# Patient Record
Sex: FEMALE | Race: AMERICAN INDIAN OR ALASKA NATIVE
[De-identification: names, ages, dates, MRNs, and addresses within clinical notes are randomized per-mention and may not be internally consistent; named-entity substitution may affect disease eponyms.]

---

## 2017-04-08 NOTE — EMERGENCY DEPARTMENT REPORT
ED General Adult HPI





- General


Chief complaint: Medical Clearance


Stated complaint: HANDS/LEGS GOING NUMB


Time Seen by Provider: 17 17:51


Source: patient


Mode of arrival: Ambulatory


Limitations: No Limitations





- History of Present Illness


Initial comments: 





PT c/o her hands and legs intermittently going numb x 1 month.  PT states she 

will have episodes of numbness daily and states it will last for a few seconds.

  On some days, she has multiple episodes.  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 

and thinks she might feel better if she had a week off work.  PT has been 

working in a warehouse for 1 year.  PT states she had Bell's Palsy at 14 years 

old.  PT denies facial numbness or paralysis.  PT unsure when her lmp was.  PT 

states it was sometime last year and when she missed her cycle, she took a 

pregnancy test and it was negative.  PT states she currently has an  

IUD.  





PT is laying on her left side with her left arm bent under her head and pt 

states, "My arm just went numb"


MD Complaint: numbness 


Onset/Timin


-: Gradual, month(s)


Location: left, right, upper extremity, lower extremity


Severity scale (0 -10): 7


Quality: other (painful numbness )


Consistency: intermittent


Improves with: other (at times, repositioning )


Worsens with: other (working )


Associated Symptoms: denies: chest pain, fever/chills, nausea/vomiting


Treatments Prior to Arrival: other (drinking ensure and beet juice )





- Related Data


 Allergies











Allergy/AdvReac Type Severity Reaction Status Date / Time


 


No Known Allergies Allergy   Unverified 17 15:32














ED Review of Systems


ROS: 


Stated complaint: HANDS/LEGS GOING NUMB


Other details as noted in HPI





Comment: All other systems reviewed and negative


Constitutional: denies: chills, fever


Eyes: denies: vision change


ENT: denies: ear pain, throat pain, congestion


Respiratory: denies: cough, shortness of breath


Cardiovascular: denies: chest pain


Endocrine: increased urine.  denies: increased thirst


Gastrointestinal: nausea.  denies: abdominal pain, vomiting


Genitourinary: frequency, abnormal menses.  denies: dysuria, discharge


Musculoskeletal: back pain (at times, she will have an ache )


Neurological: numbness.  denies: abnormal gait





ED Past Medical Hx





- Past Medical History


Previous Medical History?: Yes


Hx Hypertension: Yes (DURING PREG)





- Surgical History


Additional Surgical History: C SECTION





- Social History


Smoking Status: Current Every Day Smoker


Substance Use Type: None





ED Physical Exam





- General


Limitations: No Limitations


General appearance: alert, in no apparent distress





- Head


Head exam: Present: atraumatic, normocephalic, normal inspection





- Eye


Eye exam: Present: normal appearance, PERRL, EOMI.  Absent: conjunctival 

injection





- ENT


ENT exam: Present: normal exam, normal orophraynx, mucous membranes moist, TM's 

normal bilaterally, normal external ear exam





- Neck


Neck exam: Present: normal inspection, full ROM.  Absent: tenderness, 

meningismus





- Respiratory


Respiratory exam: Present: normal lung sounds bilaterally.  Absent: respiratory 

distress, wheezes





- Cardiovascular


Cardiovascular Exam: Present: regular rate, normal rhythm, normal heart sounds





- GI/Abdominal


GI/Abdominal exam: Present: soft.  Absent: tenderness





- Extremities Exam


Extremities exam: Present: normal inspection, full ROM, normal capillary 

refill.  Absent: tenderness, pedal edema, joint swelling, calf tenderness





- Back Exam


Back exam: Present: normal inspection, full ROM.  Absent: tenderness, CVA 

tenderness (R), CVA tenderness (L), muscle spasm, paraspinal tenderness, 

vertebral tenderness





- Neurological Exam


Neurological exam: Present: alert, oriented X3, CN II-XII intact, normal gait





- Psychiatric


Psychiatric exam: Present: flat affect





- Skin


Skin exam: Present: warm, dry, intact, normal color





ED Course


 Vital Signs











  17





  15:26


 


Temperature 98.7 F


 


Pulse Rate 97 H


 


Blood Pressure 121/78














- Reevaluation(s)


Reevaluation #1: 





17 18:17


PT aware of plan of care. 








Reevaluation #2: 





17 21:05


PT aware of lab results and plan of care.  PT asking if we did an Aids test.  

PT states she does not think that is what she has but she would like to know.  

PT aware she will need to follow up with PCP or health department for HIV 

testing.  PT verbalizes understanding. 











ED Medical Decision Making





- Lab Data


Result diagrams: 


 17 18:25





 17 19:26








 Lab Results











  17 Range/Units





  18:10 18:10 18:25 


 


WBC    6.9  (4.5-11.0)  K/mm3


 


RBC    4.44  (3.65-5.03)  M/mm3


 


Hgb    12.8  (10.1-14.3)  gm/dl


 


Hct    38.9  (30.3-42.9)  %


 


MCV    88  (79-97)  fl


 


MCH    29  (28-32)  pg


 


MCHC    33  (30-34)  %


 


RDW    12.9 L  (13.2-15.2)  %


 


Plt Count    232  (140-440)  K/mm3


 


Lymph % (Auto)    25.5  (13.4-35.0)  %


 


Mono % (Auto)    12.4 H  (0.0-7.3)  %


 


Eos % (Auto)    8.4 H  (0.0-4.3)  %


 


Baso % (Auto)    0.5  (0.0-1.8)  %


 


Lymph #    1.8  (1.2-5.4)  K/mm3


 


Mono #    0.9 H  (0.0-0.8)  K/mm3


 


Eos #    0.6 H  (0.0-0.4)  K/mm3


 


Baso #    0.0  (0.0-0.1)  K/mm3


 


Seg Neutrophils %    53.2  (40.0-70.0)  %


 


Seg Neutrophils #    3.7  (1.8-7.7)  K/mm3


 


Sodium     


 


Potassium     


 


Chloride     


 


Carbon Dioxide     


 


Anion Gap     


 


BUN     


 


Creatinine     


 


Estimated GFR     


 


BUN/Creatinine Ratio     


 


Glucose     


 


Calcium     


 


Total Bilirubin     


 


AST     


 


ALT     


 


Alkaline Phosphatase     


 


Total Protein     


 


Albumin     


 


Albumin/Globulin Ratio     


 


TSH     (0.270-4.200)  mlU/mL


 


Urine Color  Yellow    (Yellow)  


 


Urine Turbidity  Slightly-cloudy    (Clear)  


 


Urine pH  5.0    (5.0-7.0)  


 


Ur Specific Gravity  1.013    (1.003-1.030)  


 


Urine Protein  <15 mg/dl    (Negative)  mg/dL


 


Urine Glucose (UA)  Neg    (Negative)  mg/dL


 


Urine Ketones  Neg    (Negative)  mg/dL


 


Urine Blood  Sm    (Negative)  


 


Urine Nitrite  Neg    (Negative)  


 


Urine Bilirubin  Neg    (Negative)  


 


Urine Urobilinogen  4.0    (<2.0)  mg/dL


 


Ur Leukocyte Esterase  Tr    (Negative)  


 


Urine WBC (Auto)  9.0 H    (0.0-6.0)  /HPF


 


Urine RBC (Auto)  4.0    (0.0-6.0)  /HPF


 


U Epithel Cells (Auto)  14.0 H    (0-13.0)  /HPF


 


Urine Bacteria (Auto)  1+    (Negative)  /HPF


 


Urine Mucus  Few    /HPF


 


Urine HCG, Qual  Negative    (Negative)  


 


Urine Opiates Screen   Presumptive negative   


 


Urine Methadone Screen   Presumptive negative   


 


Ur Barbiturates Screen   Presumptive negative   


 


Ur Phencyclidine Scrn   Presumptive negative   


 


Ur Amphetamines Screen   Presumptive negative   


 


U Benzodiazepines Scrn   Presumptive negative   


 


Urine Cocaine Screen   Presumptive negative   


 


U Marijuana (THC) Screen   Presumptive positive   


 


Drugs of Abuse Note   Disclamer   














  17 Range/Units





  18:25 18:25 19:26 


 


WBC     (4.5-11.0)  K/mm3


 


RBC     (3.65-5.03)  M/mm3


 


Hgb     (10.1-14.3)  gm/dl


 


Hct     (30.3-42.9)  %


 


MCV     (79-97)  fl


 


MCH     (28-32)  pg


 


MCHC     (30-34)  %


 


RDW     (13.2-15.2)  %


 


Plt Count     (140-440)  K/mm3


 


Lymph % (Auto)     (13.4-35.0)  %


 


Mono % (Auto)     (0.0-7.3)  %


 


Eos % (Auto)     (0.0-4.3)  %


 


Baso % (Auto)     (0.0-1.8)  %


 


Lymph #     (1.2-5.4)  K/mm3


 


Mono #     (0.0-0.8)  K/mm3


 


Eos #     (0.0-0.4)  K/mm3


 


Baso #     (0.0-0.1)  K/mm3


 


Seg Neutrophils %     (40.0-70.0)  %


 


Seg Neutrophils #     (1.8-7.7)  K/mm3


 


Sodium  TNR   139  


 


Potassium  TNR   3.5 L  


 


Chloride  TNR   106.3  


 


Carbon Dioxide  TNR   21 L  


 


Anion Gap  TNR   15  


 


BUN  TNR   9  


 


Creatinine  TNR   0.6 L  


 


Estimated GFR  TNR   > 60  


 


BUN/Creatinine Ratio  TNR   15.00  


 


Glucose  TNR   89  


 


Calcium  TNR   8.4  


 


Total Bilirubin  TNR   0.2  


 


AST  TNR   19  


 


ALT  TNR   21  


 


Alkaline Phosphatase  TNR   69  


 


Total Protein  TNR   6.4  


 


Albumin  TNR   3.5 L  


 


Albumin/Globulin Ratio  TNR   1.2  


 


TSH   1.010   (0.270-4.200)  mlU/mL


 


Urine Color     (Yellow)  


 


Urine Turbidity     (Clear)  


 


Urine pH     (5.0-7.0)  


 


Ur Specific Gravity     (1.003-1.030)  


 


Urine Protein     (Negative)  mg/dL


 


Urine Glucose (UA)     (Negative)  mg/dL


 


Urine Ketones     (Negative)  mg/dL


 


Urine Blood     (Negative)  


 


Urine Nitrite     (Negative)  


 


Urine Bilirubin     (Negative)  


 


Urine Urobilinogen     (<2.0)  mg/dL


 


Ur Leukocyte Esterase     (Negative)  


 


Urine WBC (Auto)     (0.0-6.0)  /HPF


 


Urine RBC (Auto)     (0.0-6.0)  /HPF


 


U Epithel Cells (Auto)     (0-13.0)  /HPF


 


Urine Bacteria (Auto)     (Negative)  /HPF


 


Urine Mucus     /HPF


 


Urine HCG, Qual     (Negative)  


 


Urine Opiates Screen     


 


Urine Methadone Screen     


 


Ur Barbiturates Screen     


 


Ur Phencyclidine Scrn     


 


Ur Amphetamines Screen     


 


U Benzodiazepines Scrn     


 


Urine Cocaine Screen     


 


U Marijuana (THC) Screen     


 


Drugs of Abuse Note     














- Differential Diagnosis


hypothroidism, uti, pregnancy, new onset dm


Critical care attestation.: 


If time is entered above; I have spent that time in minutes in the direct care 

of this critically ill patient, excluding procedure time.








ED Disposition


Clinical Impression: 


 Numbness and tingling in both hands, Numbness and tingling of both legs, 

Hypokalemia, Marijuana use





Disposition: DISCHARGED TO HOME OR SELFCARE


Is pt being admited?: No


Does the pt Need Aspirin: No


Condition: Stable


Instructions:  Hypokalemia (ED), Paresthesia (ED)


Referrals: 


PRIMARY CARE,MD [Primary Care Provider] - 3-5 Days


STEPHANIE COLON MD [Staff Physician] - 3-5 Days


[facility] [Outside] - 3-5 Days


[facility] [Outside] - 3-5 Days


[facility] [Outside] - 3-5 Days


Forms:  Work/School Release Form(ED)


Time of Disposition: 21:09

## 2017-12-15 NOTE — EMERGENCY DEPARTMENT REPORT
Chief Complaint: Abdominal Pain


Stated Complaint: ABDOMINAL PAIN


Time Seen by Provider: 12/15/17 12:53





- HPI


History of Present Illness: 





Patient is a 27-year-old American female who states that this more proximally 

a.m. she started having nausea vomiting diarrhea.  Patient has vomited numerous 

times.  Patient has some mild stomach cramping.  Denies fever.  Her daughter 

has the same.





- ROS


Review of Systems: 





Review of systems negative except for those L mismatch.





- Exam


Vital Signs: 


 Vital Signs











  12/15/17





  12:37


 


Temperature 97.8 F


 


Pulse Rate 71


 


Respiratory 18





Rate 


 


Blood Pressure 120/90


 


O2 Sat by Pulse 100





Oximetry 











Physical Exam: 





Focused physical exam: Patient is alert and oriented 3 in no acute distress.  

Heart tones are normal lungs clear to auscultation abdomen is soft and 

nontender.


MSE screening note: 


Focused history and physical exam performed.


Due to findings the following was ordered:











ED Medical Decision Making





- Medical Decision Making





Patient given Zofran.  Patient does not appear to be dehydrated at this time.  

Patient is no acute distress.  Patient be discharged home





ED Disposition for MSE


Clinical Impression: 


 Viral gastroenteritis





Disposition: DC-01 TO HOME OR SELFCARE


Is pt being admited?: No


Does the pt Need Aspirin: No


Condition: Fair


Instructions:  Gastroenteritis (ED)


Prescriptions: 


Dicyclomine [Bentyl] 10 mg PO QID #10 capsule


Diphenoxylate/Atropine [Lomotil] 1 tab PO Q4H PRN #7 tablet


 PRN Reason: Diarrhea


Famotidine [Pepcid] 20 mg PO BID #14 tablet


Ondansetron [Zofran Odt] 4 mg PO TID #10 tab.lavon


Referrals: 


LETY KENNEDY MD [Primary Care Provider] - 3-5 Days

## 2017-12-15 NOTE — CAT SCAN REPORT
FINAL REPORT



EXAM:  CT ABDOMEN PELVIS WO CON



HISTORY:  Abdominal Pain 



TECHNIQUE:  CT abdomen and pelvis without contrast 



PRIORS:  None.



FINDINGS:  

No acute abnormality identified in the lung bases. 



No focal abnormality identified within the liver parenchyma.  The

spleen demonstrates normal size and attenuation. 



No pancreatic abnormalities seen. 



There is punctate nonobstructing lower pole left renal calculus.

. No ureteral calculus identified. Hypodensity at the lower pole

the right kidney most likely reflects a cyst. 



The adrenal glands are unremarkable. 



Abdominal aorta is normal in caliber. 



No pathologically enlarged lymph nodes are identified. No signs

of free fluid or free air 



No evidence of small bowel dilatation. 



No pericolonic inflammatory changes are observed. The appendix is

identified and is unremarkable.



An adjustment there is an IUD present within the uterus. Urinary

bladder is unremarkable. 



IMPRESSION:  

Nonobstructing lower pole left renal calculus 



IUD within the uterus 



No acute findings
no loss of consciousness, no gait abnormality, no headache, no sensory deficits, and no weakness.

## 2017-12-15 NOTE — EMERGENCY DEPARTMENT REPORT
ED Abdominal Pain HPI





- General


Chief Complaint: Abdominal Pain


Stated Complaint: ABDOMINAL PAIN


Time Seen by Provider: 12/15/17 12:53


Source: patient


Mode of arrival: Wheelchair


Limitations: No Limitations





- History of Present Illness


Initial Comments: 


pt is a a27 y/o aaf who presents for abdominal pain with n/v x 2 days last po 

intake yesterday pain described as 5/10 generalized aching exacerbated by 

eating relieved by nothing 





MD Complaint: abdominal pain


Onset/Timin


-: Sudden, days(s)


Location: diffuse


Radiation: none


Migration to: no migration


Severity scale (0 -10): 8


Quality: aching


Consistency: constant


Improves With: nothing


Worsens With: eating


Associated Symptoms: nausea, vomiting, chills.  denies: constipation, dysuria, 

hematemesis, hematochezia, melena, hematuria, anorexia, syncope





- Related Data


LMP (females 10-50): 3 weeks


 Previous Rx's











 Medication  Instructions  Recorded  Last Taken  Type


 


Ondansetron [Zofran TAB] 4 mg PO Q8HR PRN #20 tablet 12/15/17 Unknown Rx


 


Tamsulosin HCl [Flomax] 0.4 mg PO DAILY #30 cap.er.24h 12/15/17 Unknown Rx


 


traMADol [Ultram] 50 mg PO Q8HR PRN #30 tablet 12/15/17 Unknown Rx











 Allergies











Allergy/AdvReac Type Severity Reaction Status Date / Time


 


No Known Allergies Allergy   Unverified 17 15:32














ED Review of Systems


ROS: 


Stated complaint: ABDOMINAL PAIN


Other details as noted in HPI





Constitutional: denies: chills, fever


Eyes: denies: eye pain, eye discharge, vision change


ENT: denies: ear pain, throat pain


Respiratory: denies: cough, shortness of breath, wheezing


Cardiovascular: denies: chest pain, palpitations


Endocrine: no symptoms reported


Gastrointestinal: abdominal pain, nausea, vomiting.  denies: diarrhea, 

constipation, hematemesis, melena, hematochezia


Genitourinary: frequency.  denies: urgency, dysuria, hematuria, discharge


Musculoskeletal: denies: back pain, joint swelling, arthralgia


Skin: denies: rash, lesions


Neurological: denies: headache, weakness, paresthesias


Psychiatric: denies: anxiety, depression


Hematological/Lymphatic: denies: easy bleeding, easy bruising





ED Past Medical Hx





- Past Medical History


Previous Medical History?: Yes


Hx Hypertension: Yes (DURING PREG)





- Surgical History


Past Surgical History?: Yes


Additional Surgical History: C SECTION





- Social History


Smoking Status: Never Smoker


Substance Use Type: None





- Medications


Home Medications: 


 Home Medications











 Medication  Instructions  Recorded  Confirmed  Last Taken  Type


 


Ondansetron [Zofran TAB] 4 mg PO Q8HR PRN #20 tablet 12/15/17  Unknown Rx


 


Tamsulosin HCl [Flomax] 0.4 mg PO DAILY #30 cap.er.24h 12/15/17  Unknown Rx


 


traMADol [Ultram] 50 mg PO Q8HR PRN #30 tablet 12/15/17  Unknown Rx














ED Physical Exam





- General


Limitations: No Limitations


General appearance: alert, in no apparent distress





- Head


Head exam: Present: atraumatic, normocephalic





- Eye


Eye exam: Present: normal appearance





- ENT


ENT exam: Present: normal exam





- Neck


Neck exam: Present: normal inspection, full ROM.  Absent: lymphadenopathy, 

thyromegaly





- Respiratory


Respiratory exam: Present: normal lung sounds bilaterally.  Absent: respiratory 

distress, wheezes, rhonchi, chest wall tenderness





- Cardiovascular


Cardiovascular Exam: Present: regular rate, normal rhythm, normal heart sounds.

  Absent: systolic murmur, diastolic murmur, rubs, gallop





- GI/Abdominal


GI/Abdominal exam: Present: soft, normal bowel sounds.  Absent: distended, 

tenderness, guarding, rebound, rigid, organomegaly, mass, bruit





- Rectal


Rectal exam: Present: deferred





- Extremities Exam


Extremities exam: Present: normal inspection





- Back Exam


Back exam: Present: normal inspection





- Neurological Exam


Neurological exam: Present: alert, oriented X3, CN II-XII intact, normal gait





- Psychiatric


Psychiatric exam: Present: normal affect, normal mood





- Skin


Skin exam: Present: warm, dry, intact, normal color.  Absent: rash





ED Course


 Vital Signs











  12/15/17 12/15/17





  12:37 13:16


 


Temperature 97.8 F 


 


Pulse Rate 71 


 


Respiratory 18 18





Rate  


 


Blood Pressure 120/90 


 


O2 Sat by Pulse 100 100





Oximetry  














ED Medical Decision Making





- Lab Data


Result diagrams: 


 12/15/17 13:29





 12/15/17 13:29








 Laboratory Tests











  12/15/17 12/15/17 12/15/17





  13:29 13:29 14:50


 


WBC  13.0 H  


 


RBC  4.69  


 


Hgb  12.8  


 


Hct  39.4  


 


MCV  84  


 


MCH  27 L  


 


MCHC  32  


 


RDW  13.3  


 


Plt Count  262  


 


Eos % (Auto)  Np  


 


Add Manual Diff  Complete  


 


Total Counted  100  


 


Seg Neuts % (Manual)  75.0 H  


 


Band Neutrophils %  0  


 


Lymphocytes % (Manual)  11.0 L  


 


Reactive Lymphs % (Man)  0  


 


Monocytes % (Manual)  2.0  


 


Eosinophils % (Manual)  12.0 H  


 


Basophils % (Manual)  0  


 


Metamyelocytes %  0  


 


Myelocytes %  0  


 


Promyelocytes %  0  


 


Blast Cells %  0  


 


Nucleated RBC %  Not Reportable  


 


Seg Neutrophils # Man  9.8 H  


 


Band Neutrophils #  0.0  


 


Lymphocytes # (Manual)  1.4  


 


Abs React Lymphs (Man)  0.0  


 


Monocytes # (Manual)  0.3  


 


Eosinophils # (Manual)  1.6 H  


 


Basophils # (Manual)  0.0  


 


Metamyelocytes #  0.0  


 


Myelocytes #  0.0  


 


Promyelocytes #  0.0  


 


Blast Cells #  0.0  


 


WBC Morphology  Not Reportable  


 


Hypersegmented Neuts  Not Reportable  


 


Hyposegmented Neuts  Not Reportable  


 


Hypogranular Neuts  Not Reportable  


 


Smudge Cells  Not Reportable  


 


Toxic Granulation  Not Reportable  


 


Toxic Vacuolation  Not Reportable  


 


Dohle Bodies  Not Reportable  


 


Pelger-Huet Anomaly  Not Reportable  


 


Radha Rods  Not Reportable  


 


Platelet Estimate  Appears normal  


 


Clumped Platelets  Not Reportable  


 


Plt Clumps, EDTA  Not Reportable  


 


Large Platelets  Not Reportable  


 


Giant Platelets  Not Reportable  


 


Platelet Satelliting  Not Reportable  


 


Plt Morphology Comment  Not Reportable  


 


RBC Morphology  Normal  


 


Dimorphic RBCs  Not Reportable  


 


Polychromasia  Not Reportable  


 


Hypochromasia  Not Reportable  


 


Poikilocytosis  Not Reportable  


 


Anisocytosis  Not Reportable  


 


Microcytosis  Not Reportable  


 


Macrocytosis  Not Reportable  


 


Spherocytes  Not Reportable  


 


Pappenheimer Bodies  Not Reportable  


 


Sickle Cells  Not Reportable  


 


Target Cells  Not Reportable  


 


Tear Drop Cells  Not Reportable  


 


Ovalocytes  Not Reportable  


 


Helmet Cells  Not Reportable  


 


[place] Bodies  Not Reportable  


 


Cabot Rings  Not Reportable  


 


[place] Cells  Not Reportable  


 


Bite Cells  Not Reportable  


 


Crenated Cell  Not Reportable  


 


Elliptocytes  Not Reportable  


 


Acanthocytes (Spur)  Not Reportable  


 


Rouleaux  Not Reportable  


 


Hemoglobin C Crystals  Not Reportable  


 


Schistocytes  Not Reportable  


 


Malaria parasites  Not Reportable  


 


Isaiah Bodies  Not Reportable  


 


Hem Pathologist Commnt  No  


 


Sodium   138 


 


Potassium   4.2 


 


Chloride   103.2 


 


Carbon Dioxide   22 


 


Anion Gap   17 


 


BUN   14 


 


Creatinine   0.6 L 


 


Estimated GFR   > 60 


 


BUN/Creatinine Ratio   23 


 


Glucose   93 


 


Calcium   9.7 


 


Urine Color    Yellow


 


Urine Turbidity    Clear


 


Urine pH    5.0


 


Ur Specific Gravity    1.014


 


Urine Protein    <15 mg/dl


 


Urine Glucose (UA)    Neg


 


Urine Ketones    Neg


 


Urine Blood    Mod


 


Urine Nitrite    Neg


 


Ur Reducing Substances    Not Reportable


 


Urine Bilirubin    Neg


 


Urine Ictotest    Not Reportable


 


Urine Urobilinogen    < 2.0


 


Ur Leukocyte Esterase    Tr


 


Urine WBC (Auto)    4.0


 


Urine RBC (Auto)    < 1.0


 


U Epithel Cells (Auto)    3.0


 


Urine Bacteria (Auto)    1+


 


Urine Mucus    Few


 


Urine HCG, Qual    Negative

















- Radiology Data


Radiology results: report reviewed, image reviewed





- Medical Decision Making


pt is a a27 y/o aaf who presents for abdominal pain with n/v x 2 days last po 

intake yesterday pain described as 5/10 generalized aching exacerbated by 

eating relieved by nothing exam: abd round b/s x all 4 qds, no rebound no bruit 

no fluid shift no signs, labs: CMP: normal   CBC: normal UA: normalCT Abd/

pelvis nonobstrucing calculi left, 





1518:  pt symptoms are improved pain decreased to 2/10 no n/v and tolerating po 

intake at this time, pt will follow up with urology in 2-3 days , flomax, ultram

, pt for d/c to self in stable condition at this time. pt verbalized agreement 

and understanding of discharge plan. 





Critical care attestation.: 


If time is entered above; I have spent that time in minutes in the direct care 

of this critically ill patient, excluding procedure time.








ED Disposition


Clinical Impression: 


 Renal stones





Disposition:  TO HOME OR SELFCARE


Is pt being admited?: No


Does the pt Need Aspirin: No


Condition: Good


Prescriptions: 


Ondansetron [Zofran TAB] 4 mg PO Q8HR PRN #20 tablet


 PRN Reason: Nausea


Tamsulosin HCl [Flomax] 0.4 mg PO DAILY #30 cap.er.24h


traMADol [Ultram] 50 mg PO Q8HR PRN #30 tablet


 PRN Reason: Pain


Referrals: 


LETY KENNEDY MD [Primary Care Provider] - 3-5 Days


CHEMO ALFRED MD [Staff Physician] - 3-5 Days


Forms:  Work/School Release Form(ED)


Time of Disposition: 18:06

## 2018-07-29 NOTE — EMERGENCY DEPARTMENT REPORT
ED Shortness of Breath HPI





- General


Chief Complaint: Dyspnea/Respdistress


Stated Complaint: CHEST PAIN


Time Seen by Provider: 07/29/18 17:18


Source: patient


Mode of arrival: Ambulatory


Limitations: No Limitations





- History of Present Illness


Initial Comments: 





Patient is 28 years old female with no significant past medical history.  

Patient had presented to the ER complaining of cough, productive of greenish 

sputum associated with shortness of breath and wheezing.  Patient denied any 

fever, chills, nausea or vomiting.


MD Complaint: shortness of breath, cough


Context: recent URI


Associated Symptoms: cough





- Related Data


 Previous Rx's











 Medication  Instructions  Recorded  Last Taken  Type


 


Ondansetron [Zofran TAB] 4 mg PO Q8HR PRN #20 tablet 12/15/17 Unknown Rx


 


Tamsulosin HCl [Flomax] 0.4 mg PO DAILY #30 cap.er.24h 12/15/17 Unknown Rx


 


traMADol [Ultram] 50 mg PO Q8HR PRN #30 tablet 12/15/17 Unknown Rx











 Allergies











Allergy/AdvReac Type Severity Reaction Status Date / Time


 


No Known Allergies Allergy   Unverified 04/08/17 15:32














ED Review of Systems


ROS: 


Stated complaint: CHEST PAIN


Other details as noted in HPI





Comment: All other systems reviewed and negative


Constitutional: denies: chills, fever


Respiratory: cough, shortness of breath, wheezing.  denies: orthopnea, SOB with 

exertion, SOB at rest


Cardiovascular: denies: chest pain, palpitations, dyspnea on exertion, orthopnea


Gastrointestinal: denies: abdominal pain, nausea, vomiting, diarrhea, 

constipation, hematemesis, melena, hematochezia


Musculoskeletal: denies: back pain


Neurological: denies: headache, weakness, numbness, paresthesias, confusion





ED Past Medical Hx





- Past Medical History


Previous Medical History?: Yes


Hx Hypertension: Yes (DURING PREG)


Additional medical history: heart murmur





- Surgical History


Past Surgical History?: Yes


Additional Surgical History: C SECTION





- Social History


Smoking Status: Current Every Day Smoker


Substance Use Type: Alcohol, Marijuana





- Medications


Home Medications: 


 Home Medications











 Medication  Instructions  Recorded  Confirmed  Last Taken  Type


 


Ondansetron [Zofran TAB] 4 mg PO Q8HR PRN #20 tablet 12/15/17  Unknown Rx


 


Tamsulosin HCl [Flomax] 0.4 mg PO DAILY #30 cap.er.24h 12/15/17  Unknown Rx


 


traMADol [Ultram] 50 mg PO Q8HR PRN #30 tablet 12/15/17  Unknown Rx














ED Physical Exam





- General


Limitations: No Limitations


General appearance: alert, in no apparent distress





- Head


Head exam: Present: atraumatic, normocephalic, normal inspection





- ENT


ENT exam: Present: normal exam, normal orophraynx, mucous membranes moist





- Neck


Neck exam: Present: normal inspection, full ROM.  Absent: tenderness, 

meningismus, lymphadenopathy, thyromegaly





- Respiratory


Respiratory exam: Present: wheezes, rales.  Absent: rhonchi, stridor, chest 

wall tenderness, accessory muscle use, decreased breath sounds, prolonged 

expiratory





- Cardiovascular


Cardiovascular Exam: Present: regular rate, normal rhythm, normal heart sounds





- GI/Abdominal


GI/Abdominal exam: Present: soft, normal bowel sounds.  Absent: distended, 

tenderness, guarding, rebound, rigid, diminished bowel sounds, organomegaly, 

mass, bruit, pulsatile mass, hernia





- Extremities Exam


Extremities exam: Present: normal inspection, full ROM, normal capillary 

refill.  Absent: tenderness, pedal edema, joint swelling, calf tenderness





- Back Exam


Back exam: Present: normal inspection, full ROM.  Absent: CVA tenderness (R), 

CVA tenderness (L), muscle spasm





- Neurological Exam


Neurological exam: Present: alert, oriented X3, CN II-XII intact, normal gait, 

reflexes normal





- Skin


Skin exam: Present: warm, intact, normal color





ED Course





 Vital Signs











  07/29/18





  15:14


 


Temperature 97.7 F


 


Pulse Rate 89


 


Respiratory 22





Rate 


 


Blood Pressure 144/85


 


O2 Sat by Pulse 98





Oximetry 











Critical care attestation.: 


If time is entered above; I have spent that time in minutes in the direct care 

of this critically ill patient, excluding procedure time.








ED Disposition


Clinical Impression: 


 Acute bronchitis





Disposition: DC-01 TO HOME OR SELFCARE


Is pt being admited?: No


Condition: Stable


Instructions:  Acute Bronchitis (ED)


Referrals: 


PRIMARY CARE,MD [Primary Care Provider] - 3-5 Days

## 2019-04-22 ENCOUNTER — HOSPITAL ENCOUNTER (EMERGENCY)
Dept: HOSPITAL 5 - ED | Age: 29
Discharge: HOME | End: 2019-04-22
Payer: COMMERCIAL

## 2019-04-22 VITALS — SYSTOLIC BLOOD PRESSURE: 131 MMHG | DIASTOLIC BLOOD PRESSURE: 89 MMHG

## 2019-04-22 DIAGNOSIS — J45.901: Primary | ICD-10-CM

## 2019-04-22 DIAGNOSIS — I10: ICD-10-CM

## 2019-04-22 DIAGNOSIS — F17.200: ICD-10-CM

## 2019-04-22 DIAGNOSIS — F12.10: ICD-10-CM

## 2019-04-22 PROCEDURE — 99283 EMERGENCY DEPT VISIT LOW MDM: CPT

## 2019-04-22 PROCEDURE — 96372 THER/PROPH/DIAG INJ SC/IM: CPT

## 2019-04-22 PROCEDURE — 94640 AIRWAY INHALATION TREATMENT: CPT

## 2019-04-22 PROCEDURE — 71046 X-RAY EXAM CHEST 2 VIEWS: CPT

## 2019-04-22 NOTE — EMERGENCY DEPARTMENT REPORT
Blank Doc





- Documentation


Documentation: 





28 y o f with hx of asthma presents with dry intermittent coughing worsening x 1

week, ran out of medication at home


wheezing


cp with coughing


duonebs, steroids,cxr


acc eval

## 2019-04-22 NOTE — XRAY REPORT
PROCEDURE:  XR CHEST ROUTINE 2V 

 

TECHNIQUE:  PA and lateral chest radiographs were obtained.  

 

HISTORY: cp/cough 

 

COMPARISONS:  None. 

 

FINDINGS: 

 

Heart:  Normal. 

Mediastinum/Vessels: Normal. 

Lungs/Pleural space:  No infiltrate, effusion, or pneumothorax. 

Bony thorax: No acute osseous abnormality. 

 

 

IMPRESSION:  No pulmonary infiltrates are identified. 

 

This document is electronically signed by Elyssa Lott MD., April 22 2019 04:17:13 PM ET

## 2019-04-22 NOTE — EMERGENCY DEPARTMENT REPORT
ED Shortness of Breath HPI





- General


Chief Complaint: Dyspnea/Respdistress


Stated Complaint: CHEST PAIN/MAXIM/WHEEZING


Time Seen by Provider: 04/22/19 15:05


Source: patient


Mode of arrival: Ambulatory


Limitations: No Limitations





- History of Present Illness


Initial Comments: 





This is a 28-year-old female nontoxic, well nourished in appearance, no acute 

signs of distress presents to the ED with c/o of acute on chronic asthma 

exacerbation.  Patient stated she is out of her albuterol inhaler 1 month.  

Patient stated that she has seasonal allergies to pollen and has been outside 

that might have triggered her symptoms.  Patient stated has some dry cough.  

Patient denies any sick contact.  Patient denies any recent travels, long car, 

recent hospital stays.  Patient denies any calf pain or calf tenderness.  

Patient denies any chest pain, short of breath, fever, chills, nausea, vomiting,

hemoptysis, numbness, tingling, headache or stiff neck.  Past medical history 

includes asthma.


MD Complaint: shortness of breath, cough, "asthma attack"


-: days(s)


Pain Scale: 0


Improves With: nothing


Worsens With: nothing


Associated Symptoms: cough





- Related Data


                                  Previous Rx's











 Medication  Instructions  Recorded  Last Taken  Type


 


Ondansetron [Zofran TAB] 4 mg PO Q8HR PRN #20 tablet 12/15/17 Unknown Rx


 


Tamsulosin HCl [Flomax] 0.4 mg PO DAILY #30 cap.er.24h 12/15/17 Unknown Rx


 


traMADol [Ultram] 50 mg PO Q8HR PRN #30 tablet 12/15/17 Unknown Rx


 


ALBUTEROL Inhaler (OR & NICU) 2 puff IH QID PRN #1 inhalation 07/29/18 Unknown 

Rx





[ProAir HFA Inhaler]    


 


Amoxicillin [Amoxicillin TAB] 875 mg PO BID #14 tablet 07/29/18 Unknown Rx


 


guaiFENesin/CODEINE [Robitussin AC] 10 ml PO TID PRN #100 ml 07/29/18 Unknown Rx


 


ALBUTEROL Inhaler(NF) [VENTOLIN 2 puff IH Q4-6H PRN #1 inha 04/22/19 Unknown Rx





Inhaler(NF)]    


 


Prednisone [predniSONE 10 mg 10 mg PO .TAPER #1 tab.ds.pk 04/22/19 Unknown Rx





(6-Day Pack, 21 Tabs)]    











                                    Allergies











Allergy/AdvReac Type Severity Reaction Status Date / Time


 


No Known Allergies Allergy   Verified 10/19/18 11:10














ED Review of Systems


ROS: 


Stated complaint: CHEST PAIN/MAXIM/WHEEZING


Other details as noted in HPI





Constitutional: denies: chills, fever


Eyes: denies: eye pain, eye discharge, vision change


ENT: denies: ear pain, throat pain


Respiratory: cough, shortness of breath, wheezing


Cardiovascular: denies: chest pain, palpitations


Endocrine: no symptoms reported


Gastrointestinal: denies: abdominal pain, nausea, diarrhea


Genitourinary: denies: urgency, dysuria, discharge


Musculoskeletal: denies: back pain, joint swelling, arthralgia


Skin: denies: rash, lesions


Neurological: denies: headache, weakness, paresthesias


Psychiatric: denies: anxiety, depression


Hematological/Lymphatic: denies: easy bleeding, easy bruising





ED Past Medical Hx





- Past Medical History


Hx Hypertension: Yes (DURING PREG)


Hx Asthma: Yes


Additional medical history: heart murmur





- Surgical History


Additional Surgical History: C SECTION x2





- Social History


Smoking Status: Current Every Day Smoker


Substance Use Type: Alcohol, Marijuana





- Medications


Home Medications: 


                                Home Medications











 Medication  Instructions  Recorded  Confirmed  Last Taken  Type


 


Ondansetron [Zofran TAB] 4 mg PO Q8HR PRN #20 tablet 12/15/17  Unknown Rx


 


Tamsulosin HCl [Flomax] 0.4 mg PO DAILY #30 cap.er.24h 12/15/17  Unknown Rx


 


traMADol [Ultram] 50 mg PO Q8HR PRN #30 tablet 12/15/17  Unknown Rx


 


ALBUTEROL Inhaler (OR & NICU) 2 puff IH QID PRN #1 inhalation 07/29/18  Unknown 

Rx





[ProAir HFA Inhaler]     


 


Amoxicillin [Amoxicillin TAB] 875 mg PO BID #14 tablet 07/29/18  Unknown Rx


 


guaiFENesin/CODEINE [Robitussin AC] 10 ml PO TID PRN #100 ml 07/29/18  Unknown 

Rx


 


ALBUTEROL Inhaler(NF) [VENTOLIN 2 puff IH Q4-6H PRN #1 inha 04/22/19  Unknown Rx





Inhaler(NF)]     


 


Prednisone [predniSONE 10 mg 10 mg PO .TAPER #1 tab.ds.pk 04/22/19  Unknown Rx





(6-Day Pack, 21 Tabs)]     














ED Physical Exam





- General


Limitations: No Limitations


General appearance: alert, in no apparent distress





- Head


Head exam: Present: atraumatic, normocephalic





- Neck


Neck exam: Present: normal inspection, full ROM





- Respiratory


Respiratory exam: Present: normal lung sounds bilaterally, wheezes (bilateral 

upper and lower lobes).  Absent: respiratory distress, rales, rhonchi, stridor, 

chest wall tenderness, accessory muscle use, decreased breath sounds, prolonged 

expiratory





- Cardiovascular


Cardiovascular Exam: Present: regular rate, normal rhythm, normal heart sounds. 

Absent: bradycardia, tachycardia, irregular rhythm, systolic murmur, diastolic 

murmur, rubs, gallop





- Back Exam


Back exam: Present: normal inspection, full ROM





- Neurological Exam


Neurological exam: Present: alert, oriented X3





- Psychiatric


Psychiatric exam: Present: normal affect, normal mood





- Skin


Skin exam: Present: warm, dry, intact, normal color.  Absent: rash





ED Course





                                   Vital Signs











  04/22/19 04/22/19





  15:08 18:38


 


Temperature 98 F 


 


Pulse Rate 87 


 


Respiratory 22 22





Rate  


 


Blood Pressure 131/89 


 


O2 Sat by Pulse 100 





Oximetry  














- Reevaluation(s)


Reevaluation #1: 





04/22/19 19:16


Patient is speaking in full sentences with no signs of distress noted.





ED Medical Decision Making





- Medical Decision Making





This is a 28-year-old female that presents with asthma exacerbation.  Patient is

stable and was examined by me.  Chest x-ray has been obtained and dictated by 

the radiologist within normal limits.  Patient is notified of the x-ray report 

with no questions noted by the patient.  Patient did receive DuoNeb and steroids

in the ED which patient the symptoms has resolved and subsided.  Posttreatment 

and there is no wheezing upon auscultation.  Patient is discharged with 

albuterol and prednisone.  Patient was referred to Follow-up with a primary care

doctor in 3-5 days or if symptoms worsen and continue return to emergency room 

as soon as possible.  At time of discharge, the patient does not seem toxic or 

ill in appearance.  No acute signs of distress noted.  Patient agrees to 

discharge treatment plan of care.  No further questions noted by the patient.





This chart is dictated with using Dragon Dictation Program


Critical care attestation.: 


If time is entered above; I have spent that time in minutes in the direct care 

of this critically ill patient, excluding procedure time.








ED Disposition


Clinical Impression: 


Asthma exacerbation


Qualifiers:


 Asthma severity: mild Asthma persistence: intermittent Qualified Code(s): 

J45.21 - Mild intermittent asthma with (acute) exacerbation





Disposition: DC-01 TO HOME OR SELFCARE


Is pt being admited?: No


Does the pt Need Aspirin: No


Condition: Stable


Instructions:  Asthma (ED)


Additional Instructions: 


Follow-up with a primary care doctor in 3-5 days or if symptoms worsen and 

continue return to emergency room as soon as possible. 


Prescriptions: 


Prednisone [predniSONE 10 mg (6-Day Pack, 21 Tabs)] 10 mg PO .TAPER #1 tab.ds.pk


ALBUTEROL Inhaler(NF) [VENTOLIN Inhaler(NF)] 2 puff IH Q4-6H PRN #1 inha


 PRN Reason: Wheezing


Referrals: 


FELTON LITTLEJOHN MD [Primary Care Provider] - 3-5 Days


PRIMARY CARE,MD [Referring] - 3-5 Days


TYRON JIANG MD [Staff Physician] - 3-5 Days


[facility] [Outside] - 3-5 Days


[facility] [Outside] - 3-5 Days


Forms:  Work/School Release Form(ED)

## 2020-01-07 ENCOUNTER — HOSPITAL ENCOUNTER (EMERGENCY)
Dept: HOSPITAL 5 - ED | Age: 30
LOS: 1 days | Discharge: HOME | End: 2020-01-08
Payer: COMMERCIAL

## 2020-01-07 VITALS — SYSTOLIC BLOOD PRESSURE: 152 MMHG | DIASTOLIC BLOOD PRESSURE: 93 MMHG

## 2020-01-07 DIAGNOSIS — Z79.899: ICD-10-CM

## 2020-01-07 DIAGNOSIS — Z98.890: ICD-10-CM

## 2020-01-07 DIAGNOSIS — Z79.2: ICD-10-CM

## 2020-01-07 DIAGNOSIS — Z79.1: ICD-10-CM

## 2020-01-07 DIAGNOSIS — I10: ICD-10-CM

## 2020-01-07 DIAGNOSIS — J40: Primary | ICD-10-CM

## 2020-01-07 PROCEDURE — 99283 EMERGENCY DEPT VISIT LOW MDM: CPT

## 2020-01-07 PROCEDURE — 96372 THER/PROPH/DIAG INJ SC/IM: CPT

## 2020-01-07 PROCEDURE — 71046 X-RAY EXAM CHEST 2 VIEWS: CPT

## 2020-01-07 PROCEDURE — 94644 CONT INHLJ TX 1ST HOUR: CPT

## 2020-01-07 NOTE — EMERGENCY DEPARTMENT REPORT
- General


Chief Complaint: Upper Respiratory Infection


Stated Complaint: DIFF BREATHING


Time Seen by Provider: 20 23:06


Source: patient


Mode of arrival: Ambulatory


Limitations: No Limitations





- History of Present Illness


Initial Comments: 


 Ms. Hamilton is a 29-year-old [place] female with a history of bronchitis who 

presents for cough wheezing 4 days.  Patient states symptoms started with 

weather change.  There is no fevers or chills no chest pain.  Symptoms are 

exacerbated abdominal exposure. Symptoms are relieved by rest.





MD Complaint: cough, sore throat, rhinorrhea, nasal congestion, other (wheezing 

)


Onset/Timin


-: days(s)


Severity: moderate


Severity scale (0 -10): 4


Quality: aching


Consistency: constant


Improves With: nothing


Worsens With: activity


Context: sick contacts


Associated Symptoms: rhinorrhea, nasal congestion, sore throat, cough, shortness

of breath





- Related Data


                                  Previous Rx's











 Medication  Instructions  Recorded  Last Taken  Type


 


Ondansetron [Zofran TAB] 4 mg PO Q8HR PRN #20 tablet 12/15/17 Unknown Rx


 


Tamsulosin HCl [Flomax] 0.4 mg PO DAILY #30 cap.er.24h 12/15/17 Unknown Rx


 


traMADoL [Ultram] 50 mg PO Q8HR PRN #30 tablet 12/15/17 Unknown Rx


 


Albuterol INH(or & Nicu Only) 2 puff IH QID PRN #1 inhalation 18 Unknown 

Rx





[ProAir HFA Inhaler]    


 


Amoxicillin [Amoxicillin TAB] 875 mg PO BID #14 tablet 18 Unknown Rx


 


guaiFENesin/CODEINE [Robitussin AC] 10 ml PO TID PRN #100 ml 18 Unknown Rx


 


ALBUTEROL Inhaler(NF) [VENTOLIN 2 puff IH Q4-6H PRN #1 inha 19 Unknown Rx





Inhaler(NF)]    


 


Prednisone [predniSONE 10 mg 10 mg PO .TAPER #1 tab.ds.pk 19 Unknown Rx





(6-Day Pack, 21 Tabs)]    


 


Albuterol INH(or & Nicu Only) 2 puff IH QID PRN #8.5 gram 20 Unknown Rx





[ProAir HFA Inhaler]    


 


Azithromycin [Zithromax Z-DAYDAY] 250 mg PO DAILY #6 tab 20 Unknown Rx


 


Benzonatate [Tessalon Perles] 100 mg PO Q8HR PRN #30 capsule 20 Unknown Rx


 


Ibuprofen [Motrin 800 MG tab] 800 mg PO Q8HR PRN #30 tablet 20 Unknown Rx











                                    Allergies











Allergy/AdvReac Type Severity Reaction Status Date / Time


 


No Known Allergies Allergy   Verified 10/19/18 11:10














ED Review of Systems


ROS: 


Stated complaint: DIFF BREATHING


Other details as noted in HPI





Constitutional: denies: chills, fever


Eyes: denies: eye pain, eye discharge, vision change


ENT: ear pain, throat pain, congestion


Respiratory: cough, shortness of breath, wheezing


Cardiovascular: denies: chest pain, palpitations


Endocrine: no symptoms reported


Gastrointestinal: denies: abdominal pain, nausea, vomiting, diarrhea


Genitourinary: denies: urgency, dysuria, discharge


Musculoskeletal: denies: back pain


Skin: denies: rash


Neurological: denies: headache, weakness, paresthesias


Psychiatric: denies: anxiety, depression


Hematological/Lymphatic: denies: easy bleeding, easy bruising





ED Past Medical Hx





- Past Medical History


Previous Medical History?: Yes


Hx Hypertension: Yes (DURING PREG)


Hx Asthma: Yes


Additional medical history: heart murmur





- Surgical History


Past Surgical History?: Yes


Additional Surgical History: C SECTION x2





- Social History


Smoking Status: Never Smoker


Substance Use Type: None





- Medications


Home Medications: 


                                Home Medications











 Medication  Instructions  Recorded  Confirmed  Last Taken  Type


 


Ondansetron [Zofran TAB] 4 mg PO Q8HR PRN #20 tablet 12/15/17  Unknown Rx


 


Tamsulosin HCl [Flomax] 0.4 mg PO DAILY #30 cap.er.24h 12/15/17  Unknown Rx


 


traMADoL [Ultram] 50 mg PO Q8HR PRN #30 tablet 12/15/17  Unknown Rx


 


Albuterol INH(or & Nicu Only) 2 puff IH QID PRN #1 inhalation 18  Unknown 

Rx





[ProAir HFA Inhaler]     


 


Amoxicillin [Amoxicillin TAB] 875 mg PO BID #14 tablet 18  Unknown Rx


 


guaiFENesin/CODEINE [Robitussin AC] 10 ml PO TID PRN #100 ml 18  Unknown 

Rx


 


ALBUTEROL Inhaler(NF) [VENTOLIN 2 puff IH Q4-6H PRN #1 inha 19  Unknown Rx





Inhaler(NF)]     


 


Prednisone [predniSONE 10 mg 10 mg PO .TAPER #1 tab.ds.pk 19  Unknown Rx





(6-Day Pack, 21 Tabs)]     


 


Albuterol INH(or & Nicu Only) 2 puff IH QID PRN #8.5 gram 20  Unknown Rx





[ProAir HFA Inhaler]     


 


Azithromycin [Zithromax Z-DAYDAY] 250 mg PO DAILY #6 tab 20  Unknown Rx


 


Benzonatate [Tessalon Perles] 100 mg PO Q8HR PRN #30 capsule 20  Unknown 

Rx


 


Ibuprofen [Motrin 800 MG tab] 800 mg PO Q8HR PRN #30 tablet 20  Unknown Rx














ED Physical Exam





- General


Limitations: No Limitations


General appearance: alert, in no apparent distress





- Head


Head exam: Present: atraumatic, normocephalic





- Eye


Eye exam: Present: normal appearance, PERRL


Pupils: Present: normal accommodation





- ENT


ENT exam: Present: mucous membranes moist, TM's normal bilaterally, normal 

external ear exam





- Expanded ENT Exam


  ** Expanded


Throat exam: Positive: tonsillar erythema, tonsillomegaly, other (uvulal midline

no swelling no exudate no lesions no stridor ).  Negative: tonsillar exudate, R 

peritonsillar mass, L peritonsillar mass





- Neck


Neck exam: Present: normal inspection, full ROM.  Absent: tenderness, 

lymphadenopathy





- Respiratory


Respiratory exam: Present: wheezes, chest wall tenderness (right lateral chest 

wall tenderness. ).  Absent: respiratory distress, rales, rhonchi, stridor





- Cardiovascular


Cardiovascular Exam: Present: regular rate, normal rhythm, normal heart sounds. 

Absent: systolic murmur, diastolic murmur, rubs, gallop





- GI/Abdominal


GI/Abdominal exam: Present: soft, normal bowel sounds.  Absent: distended, 

tenderness, bruit, hernia





- Rectal


Rectal exam: Present: deferred





- Extremities Exam


Extremities exam: Present: normal inspection, full ROM.  Absent: tenderness





- Back Exam


Back exam: Present: normal inspection, full ROM.  Absent: tenderness, rash noted





- Neurological Exam


Neurological exam: Present: alert, oriented X3, CN II-XII intact, normal gait





- Psychiatric


Psychiatric exam: Present: normal affect, normal mood





- Skin


Skin exam: Present: warm, dry, intact, normal color.  Absent: rash





ED Course


                                   Vital Signs











  20





  21:53 00:22 00:36


 


Temperature 97.9 F  


 


Pulse Rate 90  


 


Pulse Rate [   70





Bilateral]   


 


Respiratory 18 20 





Rate   


 


Respiratory   24





Rate [Bilateral   





]   


 


Blood Pressure 152/93  


 


Blood Pressure 152/93  





[Right]   


 


O2 Sat by Pulse 100  





Oximetry   














ED Medical Decision Making





- Radiology Data


Radiology results: report reviewed, image reviewed


 


Ordering Physician: HEMAL DELACRUZ NP


Date of Service: 20


Procedure(s): XR chest routine 2V


Accession Number(s): [id number]





cc: HEMAL DELACRUZ NP





Fluoro Time In Minutes:





CHEST 2 VIEWS, 2020 12:16 AM





INDICATION: Cough. Shortness of breath.





COMPARISON: Chest radiograph, 2019





FINDINGS:





Support devices: None


Heart: The heart remains normal in size.


Lungs/pleura: No focal airspace consolidation or significant pleural effusion is

 visualized.


Additional findings: None





IMPRESSION:


1. No evidence of acute cardiopulmonary process.





Signer Name: Mary Kennedy MD


Signed: 2020 12:32 AM


Workstation Name: [id number]








Transcribed By: EB


Dictated By: Mary Kennedy MD


Electronically Authenticated By: Mary Kennedy MD


Signed Date/Time: 20











DD/DT: 20


TD/TT:








- Medical Decision Making


 This is bronchitis plan: refill albuterol, Z-Dayday,ibuprofen, and Tessalon 

Pearles.  follow up PCP in 2-3 days return to the ED should symptoms worsen.  

Patient verbalizes agreement and understanding of discharge plan.  Patient DC'd 

home in stable condition at this time





Critical care attestation.: 


If time is entered above; I have spent that time in minutes in the direct care 

of this critically ill patient, excluding procedure time.








ED Disposition


Clinical Impression: 


 Bronchitis





Disposition: DC-01 TO HOME OR SELFCARE


Is pt being admited?: No


Does the pt Need Aspirin: No


Condition: Stable


Instructions:  Acute Bronchitis (ED)


Prescriptions: 


Ibuprofen [Motrin 800 MG tab] 800 mg PO Q8HR PRN #30 tablet


 PRN Reason: pain


Albuterol INH(or & Nicu Only) [ProAir HFA Inhaler] 2 puff IH QID PRN #8.5 gram


 PRN Reason: Shortness Of Breath


Benzonatate [Tessalon Perles] 100 mg PO Q8HR PRN #30 capsule


 PRN Reason: Cough


Azithromycin [Zithromax Z-DAYDAY] 250 mg PO DAILY #6 tab


Referrals: 


PRIMARY CARE,MD [Primary Care Provider] - 3-5 Days


[facility] [Outside] - 3-5 Days


Forms:  Work/School Release Form(ED)


Time of Disposition: 01:44

## 2020-01-08 NOTE — XRAY REPORT
CHEST 2 VIEWS, 1/8/2020 12:16 AM



INDICATION: Cough. Shortness of breath.



COMPARISON: Chest radiograph, 4/22/2019



FINDINGS:



Support devices: None

Heart: The heart remains normal in size.

Lungs/pleura: No focal airspace consolidation or significant pleural effusion is visualized.

Additional findings: None



IMPRESSION:

1. No evidence of acute cardiopulmonary process.



Signer Name: Mary Kennedy MD 

Signed: 1/8/2020 12:32 AM

 Workstation Name: "[email or web address]

## 2020-03-07 ENCOUNTER — HOSPITAL ENCOUNTER (EMERGENCY)
Dept: HOSPITAL 5 - ED | Age: 30
Discharge: HOME | End: 2020-03-07
Payer: SELF-PAY

## 2020-03-07 VITALS — DIASTOLIC BLOOD PRESSURE: 75 MMHG | SYSTOLIC BLOOD PRESSURE: 111 MMHG

## 2020-03-07 DIAGNOSIS — J45.909: Primary | ICD-10-CM

## 2020-03-07 DIAGNOSIS — F17.200: ICD-10-CM

## 2020-03-07 DIAGNOSIS — I10: ICD-10-CM

## 2020-03-07 DIAGNOSIS — Z79.899: ICD-10-CM

## 2020-03-07 DIAGNOSIS — F12.10: ICD-10-CM

## 2020-03-07 PROCEDURE — 94640 AIRWAY INHALATION TREATMENT: CPT

## 2020-03-07 PROCEDURE — 71046 X-RAY EXAM CHEST 2 VIEWS: CPT

## 2020-03-07 PROCEDURE — 94644 CONT INHLJ TX 1ST HOUR: CPT

## 2020-03-07 PROCEDURE — 99283 EMERGENCY DEPT VISIT LOW MDM: CPT

## 2020-03-07 NOTE — EMERGENCY DEPARTMENT REPORT
ED Asthma HPI





- General


Chief Complaint: Adult Asthma


Stated Complaint: COUGHING/WHEEZING


Time Seen by Provider: 20 00:56


Source: patient


Mode of arrival: Ambulatory


Limitations: No Limitations





- History of Present Illness


MD Complaint: "asthma attack", shortness of breath, wheezing


-: Gradual


Asthma History: childhood onset


Severity: moderate


Context: other (Motrin around to catch the bus and developed some shortness of 

breath and wheezing did not have her inhaler so she came to the emergency 

department.)


Associated Symptoms: none





- Related Data


                                  Previous Rx's











 Medication  Instructions  Recorded  Last Taken  Type


 


Ondansetron [Zofran TAB] 4 mg PO Q8HR PRN #20 tablet 12/15/17 Unknown Rx


 


Tamsulosin HCl [Flomax] 0.4 mg PO DAILY #30 cap.er.24h 12/15/17 Unknown Rx


 


traMADoL [Ultram] 50 mg PO Q8HR PRN #30 tablet 12/15/17 Unknown Rx


 


Albuterol INH(or & Nicu Only) 2 puff IH QID PRN #1 inhalation 18 Unknown 

Rx





[ProAir HFA Inhaler]    


 


Amoxicillin [Amoxicillin TAB] 875 mg PO BID #14 tablet 18 Unknown Rx


 


guaiFENesin/CODEINE [Robitussin AC] 10 ml PO TID PRN #100 ml 18 Unknown Rx


 


ALBUTEROL Inhaler(NF) [VENTOLIN 2 puff IH Q4-6H PRN #1 inha 19 Unknown Rx





Inhaler(NF)]    


 


Prednisone [predniSONE 10 mg 10 mg PO .TAPER #1 tab.ds.pk 19 Unknown Rx





(6-Day Pack, 21 Tabs)]    


 


Albuterol INH(or & Nicu Only) 2 puff IH QID PRN #8.5 gram 20 Unknown Rx





[ProAir HFA Inhaler]    


 


Azithromycin [Zithromax Z-DAYDAY] 250 mg PO DAILY #6 tab 20 Unknown Rx


 


Benzonatate [Tessalon Perles] 100 mg PO Q8HR PRN #30 capsule 20 Unknown Rx


 


Ibuprofen [Motrin 800 MG tab] 800 mg PO Q8HR PRN #30 tablet 20 Unknown Rx


 


Albuterol INH(or & Nicu Only) 2 puff IH QID PRN #1 inhalation 20 Unknown 

Rx





[ProAir HFA Inhaler]    


 


Montelukast [Singulair] 10 mg PO QPM #14 tablet 20 Unknown Rx


 


predniSONE [Deltasone] 50 mg PO QDAY #5 tab 20 Unknown Rx











                                    Allergies











Allergy/AdvReac Type Severity Reaction Status Date / Time


 


No Known Allergies Allergy   Verified 10/19/18 11:10














ED Review of Systems


ROS: 


Stated complaint: COUGHING/WHEEZING


Other details as noted in HPI





Comment: All other systems reviewed and negative





ED Past Medical Hx





- Past Medical History


Previous Medical History?: Yes


Hx Hypertension: Yes (DURING PREG)


Hx Asthma: Yes


Additional medical history: heart murmur, Bronchitis





- Surgical History


Past Surgical History?: Yes


Additional Surgical History: C SECTION x2





- Social History


Smoking Status: Current Every Day Smoker


Substance Use Type: Marijuana





- Medications


Home Medications: 


                                Home Medications











 Medication  Instructions  Recorded  Confirmed  Last Taken  Type


 


Ondansetron [Zofran TAB] 4 mg PO Q8HR PRN #20 tablet 12/15/17  Unknown Rx


 


Tamsulosin HCl [Flomax] 0.4 mg PO DAILY #30 cap.er.24h 12/15/17  Unknown Rx


 


traMADoL [Ultram] 50 mg PO Q8HR PRN #30 tablet 12/15/17  Unknown Rx


 


Albuterol INH(or & Nicu Only) 2 puff IH QID PRN #1 inhalation 18  Unknown 

Rx





[ProAir HFA Inhaler]     


 


Amoxicillin [Amoxicillin TAB] 875 mg PO BID #14 tablet 18  Unknown Rx


 


guaiFENesin/CODEINE [Robitussin AC] 10 ml PO TID PRN #100 ml 18  Unknown 

Rx


 


ALBUTEROL Inhaler(NF) [VENTOLIN 2 puff IH Q4-6H PRN #1 inha 19  Unknown Rx





Inhaler(NF)]     


 


Prednisone [predniSONE 10 mg 10 mg PO .TAPER #1 tab.ds.pk 19  Unknown Rx





(6-Day Pack, 21 Tabs)]     


 


Albuterol INH(or & Nicu Only) 2 puff IH QID PRN #8.5 gram 20  Unknown Rx





[ProAir HFA Inhaler]     


 


Azithromycin [Zithromax Z-DAYDAY] 250 mg PO DAILY #6 tab 20  Unknown Rx


 


Benzonatate [Tessalon Perles] 100 mg PO Q8HR PRN #30 capsule 20  Unknown 

Rx


 


Ibuprofen [Motrin 800 MG tab] 800 mg PO Q8HR PRN #30 tablet 20  Unknown Rx


 


Albuterol INH(or & Nicu Only) 2 puff IH QID PRN #1 inhalation 20  Unknown 

Rx





[ProAir HFA Inhaler]     


 


Montelukast [Singulair] 10 mg PO QPM #14 tablet 20  Unknown Rx


 


predniSONE [Deltasone] 50 mg PO QDAY #5 tab 20  Unknown Rx














ED Physical Exam





- General


Limitations: No Limitations


General appearance: alert, in no apparent distress





- Head


Head exam: Present: atraumatic, normocephalic





- Eye


Eye exam: Present: normal appearance, PERRL, EOMI


Pupils: Present: normal accommodation





- ENT


ENT exam: Present: normal exam, mucous membranes moist, TM's normal bilaterally





- Neck


Neck exam: Present: normal inspection, full ROM.  Absent: tenderness, 

meningismus, lymphadenopathy





- Respiratory


Respiratory exam: Present: normal lung sounds bilaterally, wheezes, rhonchi.  

Absent: respiratory distress





- Cardiovascular


Cardiovascular Exam: Present: regular rate, normal rhythm.  Absent: systolic m

urmur, diastolic murmur, rubs, gallop





- GI/Abdominal


GI/Abdominal exam: Present: soft, normal bowel sounds





- Extremities Exam


Extremities exam: Present: normal inspection





- Back Exam


Back exam: Present: normal inspection





- Neurological Exam


Neurological exam: Present: alert, oriented X3





- Psychiatric


Psychiatric exam: Present: normal affect, normal mood





- Skin


Skin exam: Present: warm, dry, intact, normal color.  Absent: rash





ED Course





                                   Vital Signs











  20





  00:18 00:55 01:00


 


Temperature 98.9 F 98.7 F 


 


Pulse Rate 86 71 67


 


Pulse Rate [   





Bilateral   





Throughout]   


 


Respiratory 14 15 13





Rate   


 


Respiratory   





Rate [Bilateral   





Throughout]   


 


Blood Pressure 154/106  121/68


 


Blood Pressure  123/73 





[Left]   


 


O2 Sat by Pulse 98 99 98





Oximetry   














  20





  01:17 01:30 02:00


 


Temperature   


 


Pulse Rate  75 77


 


Pulse Rate [ 72  





Bilateral   





Throughout]   


 


Respiratory  11 L 13





Rate   


 


Respiratory 16  





Rate [Bilateral   





Throughout]   


 


Blood Pressure  120/84 129/76


 


Blood Pressure   





[Left]   


 


O2 Sat by Pulse  100 99





Oximetry   














ED Medical Decision Making





- Radiology Data


Radiology results: report reviewed





[facility] 


[address] [place] [address] 





XRay Report 


Signed 





 Patient: RYAN LEÓN MR#: 


 [id number] 


 : 1990 Acct:[id number] 





 Age/Sex: 29 / F ADM Date: 20 





 Loc: ED 


 Attending Dr: 








 Ordering Physician: LORRAINE HARRIS MD 


 Date of Service: 20 


 Procedure(s): XR chest routine 2V 


 Accession Number(s): [id number] 





 cc: ED MD STEVEN 





 Fluoro Time In Minutes: 





 CHEST 2 VIEWS 





INDICATION / CLINICAL INFORMATION: 


cough and arnulfo. 





COMPARISON: 


2020 





FINDINGS: 





SUPPORT DEVICES: None. 





HEART / MEDIASTINUM: No significant abnormality. 





LUNGS / PLEURA: No significant pulmonary or pleural abnormality. No 

pneumothorax. No evidence of 


 pneumonia. 





ADDITIONAL FINDINGS: No significant additional findings. 





IMPRESSION: 


1. No acute findings. No interval change. 





Signer Name: Renee Galloway MD 


Signed: 3/7/2020 12:59 AM 


Workstation Name: [id number] 








 Transcribed By: JR 


 Dictated By: Renee Galloway MD 


 Electronically Authenticated By: Renee Galloway MD 


 Signed Date/Time: 20 











 DD/DT: 20 





- Medical Decision Making





29-year-old F American female past medical history of asthma presents emerge 

department with shortness of breath and wheezing suggest suggestive of asthma 

exacerbation chest x-ray was normal she was treated accordingly with steroids 

and bronchodilators and her symptoms improved she is procedure she is resting 

comfortably able to speak in full sentences with no limitations.  Plan is to 

refill her inhaler start her on Singulair and steroids and encouraged on 

medication compliance and environmental protections


Critical care attestation.: 


If time is entered above; I have spent that time in minutes in the direct care 

of this critically ill patient, excluding procedure time.








ED Disposition


Clinical Impression: 


 Asthma attack





Disposition: DC-01 TO HOME OR SELFCARE


Is pt being admited?: No


Does the pt Need Aspirin: No


Condition: Stable


Instructions:  Asthma (ED)


Additional Instructions: 


Return to the hospital if your child is having difficulty breathing - breathing 

too fast, using neck muscles or belly to help with breathing. If your child is 

gasping for air or very distressed, or is turning blue around the mouth, call 

[phone]. Use albuterol every four hours until your child is seen by her 

pediatrician. She will need it every four hours while she recovers from this 

illness. Your pediatrician will give you instructions on how much longer to use 

it regularly and when you can go back to using it as needed. Take the Flovent 

twice daily as prescribed. This is your controller medication, so it needs to be

 taken every day whether you feel healthy or sick. It is to help prevent you 

from having an asthma attack.


Prescriptions: 


predniSONE [Deltasone] 50 mg PO QDAY #5 tab


Albuterol INH(or & Nicu Only) [ProAir HFA Inhaler] 2 puff IH QID PRN #1 

inhalation


 PRN Reason: Shortness Of Breath


Montelukast [Singulair] 10 mg PO QPM #14 tablet


Referrals: 


[facility], [facility] [Primary Care Provider] - 3-5 Days

## 2020-03-07 NOTE — XRAY REPORT
CHEST 2 VIEWS 



INDICATION / CLINICAL INFORMATION:

cough and arnulfo.



COMPARISON: 

1/8/2020



FINDINGS:



SUPPORT DEVICES: None.



HEART / MEDIASTINUM: No significant abnormality. 



LUNGS / PLEURA: No significant pulmonary or pleural abnormality. No pneumothorax. No evidence of pneu
monia.



ADDITIONAL FINDINGS: No significant additional findings.



IMPRESSION:

1. No acute findings. No interval change.



Signer Name: Renee Galloway MD 

Signed: 3/7/2020 12:59 AM

Workstation Name: [id number]

## 2020-06-14 ENCOUNTER — HOSPITAL ENCOUNTER (EMERGENCY)
Dept: HOSPITAL 5 - ED | Age: 30
Discharge: HOME | End: 2020-06-14
Payer: COMMERCIAL

## 2020-06-14 ENCOUNTER — HOSPITAL ENCOUNTER (EMERGENCY)
Dept: HOSPITAL 5 - ED | Age: 30
Discharge: LEFT BEFORE DISCHARGE | End: 2020-06-14
Payer: COMMERCIAL

## 2020-06-14 VITALS — DIASTOLIC BLOOD PRESSURE: 94 MMHG | SYSTOLIC BLOOD PRESSURE: 151 MMHG

## 2020-06-14 VITALS — SYSTOLIC BLOOD PRESSURE: 147 MMHG | DIASTOLIC BLOOD PRESSURE: 98 MMHG

## 2020-06-14 DIAGNOSIS — Z79.899: ICD-10-CM

## 2020-06-14 DIAGNOSIS — Z87.09: ICD-10-CM

## 2020-06-14 DIAGNOSIS — F17.200: ICD-10-CM

## 2020-06-14 DIAGNOSIS — Z53.21: ICD-10-CM

## 2020-06-14 DIAGNOSIS — Z79.1: ICD-10-CM

## 2020-06-14 DIAGNOSIS — R05: Primary | ICD-10-CM

## 2020-06-14 DIAGNOSIS — R05: ICD-10-CM

## 2020-06-14 DIAGNOSIS — H57.11: ICD-10-CM

## 2020-06-14 DIAGNOSIS — F12.10: ICD-10-CM

## 2020-06-14 DIAGNOSIS — H10.31: Primary | ICD-10-CM

## 2020-06-14 PROCEDURE — 99282 EMERGENCY DEPT VISIT SF MDM: CPT

## 2020-06-14 NOTE — EMERGENCY DEPARTMENT REPORT
ED Eye Problem HPI





- General


Stated complaint: POSSIBLE PINK EYE/COUGHING


Time Seen by Provider: 06/14/20 11:09





- History of Present Illness


Initial comments: 





pt is a 30 yo female who presents to the ED with c/o right eye erythema and 

irritation that began yesterday. she states that she was around someone with a 

stye. she states she has had drainage. no contact lens use. she denies anything 

getting into the eye.  she has associated photophobia. she states she also has a

cough for 3 days. she states it is non productive. no fever, no vomiting, 

diarrhea, SOB, CP. PMHx none. no allergies to meds. no known sick contacts. 

states she recently returned from [place]. she is a smoker. 





- Related Data


                                  Previous Rx's











 Medication  Instructions  Recorded  Last Taken  Type


 


Ondansetron [Zofran TAB] 4 mg PO Q8HR PRN #20 tablet 12/15/17 Unknown Rx


 


Tamsulosin HCl [Flomax] 0.4 mg PO DAILY #30 cap.er.24h 12/15/17 Unknown Rx


 


traMADoL [Ultram] 50 mg PO Q8HR PRN #30 tablet 12/15/17 Unknown Rx


 


Albuterol INH(or & Nicu Only) 2 puff IH QID PRN #1 inhalation 07/29/18 Unknown 

Rx





[ProAir HFA Inhaler]    


 


Amoxicillin [Amoxicillin TAB] 875 mg PO BID #14 tablet 07/29/18 Unknown Rx


 


guaiFENesin/CODEINE [Robitussin AC] 10 ml PO TID PRN #100 ml 07/29/18 Unknown Rx


 


ALBUTEROL Inhaler(NF) [VENTOLIN 2 puff IH Q4-6H PRN #1 inha 04/22/19 Unknown Rx





Inhaler(NF)]    


 


Prednisone [predniSONE 10 mg 10 mg PO .TAPER #1 tab.ds.pk 04/22/19 Unknown Rx





(6-Day Pack, 21 Tabs)]    


 


Albuterol INH(or & Nicu Only) 2 puff IH QID PRN #8.5 gram 01/08/20 Unknown Rx





[ProAir HFA Inhaler]    


 


Azithromycin [Zithromax Z-DAYDAY] 250 mg PO DAILY #6 tab 01/08/20 Unknown Rx


 


Benzonatate [Tessalon Perles] 100 mg PO Q8HR PRN #30 capsule 01/08/20 Unknown Rx


 


Ibuprofen [Motrin 800 MG tab] 800 mg PO Q8HR PRN #30 tablet 01/08/20 Unknown Rx


 


Albuterol INH(or & Nicu Only) 2 puff IH QID PRN #1 inhalation 03/07/20 Unknown 

Rx





[ProAir HFA Inhaler]    


 


Montelukast [Singulair] 10 mg PO QPM #14 tablet 03/07/20 Unknown Rx


 


predniSONE [Deltasone] 50 mg PO QDAY #5 tab 03/07/20 Unknown Rx


 


Erythromycin [Erythromycin Ophth 1 applic OP QID 10 Days #1 tube 06/14/20 

Unknown Rx





Oint]    











                                    Allergies











Allergy/AdvReac Type Severity Reaction Status Date / Time


 


No Known Allergies Allergy   Verified 06/14/20 09:16














ED Review of Systems


ROS: 


Stated complaint: POSSIBLE PINK EYE/COUGHING


Other details as noted in HPI





Comment: All other systems reviewed and negative





ED Past Medical Hx





- Past Medical History


Hx Hypertension: Yes (DURING PREG)


Hx Asthma: Yes


Additional medical history: heart murmur, Bronchitis





- Surgical History


Additional Surgical History: C SECTION x2





- Social History


Smoking Status: Current Every Day Smoker


Substance Use Type: Alcohol, Marijuana





- Medications


Home Medications: 


                                Home Medications











 Medication  Instructions  Recorded  Confirmed  Last Taken  Type


 


Ondansetron [Zofran TAB] 4 mg PO Q8HR PRN #20 tablet 12/15/17  Unknown Rx


 


Tamsulosin HCl [Flomax] 0.4 mg PO DAILY #30 cap.er.24h 12/15/17  Unknown Rx


 


traMADoL [Ultram] 50 mg PO Q8HR PRN #30 tablet 12/15/17  Unknown Rx


 


Albuterol INH(or & Nicu Only) 2 puff IH QID PRN #1 inhalation 07/29/18  Unknown 

Rx





[ProAir HFA Inhaler]     


 


Amoxicillin [Amoxicillin TAB] 875 mg PO BID #14 tablet 07/29/18  Unknown Rx


 


guaiFENesin/CODEINE [Robitussin AC] 10 ml PO TID PRN #100 ml 07/29/18  Unknown 

Rx


 


ALBUTEROL Inhaler(NF) [VENTOLIN 2 puff IH Q4-6H PRN #1 inha 04/22/19  Unknown Rx





Inhaler(NF)]     


 


Prednisone [predniSONE 10 mg 10 mg PO .TAPER #1 tab.ds.pk 04/22/19  Unknown Rx





(6-Day Pack, 21 Tabs)]     


 


Albuterol INH(or & Nicu Only) 2 puff IH QID PRN #8.5 gram 01/08/20  Unknown Rx





[ProAir HFA Inhaler]     


 


Azithromycin [Zithromax Z-DAYDAY] 250 mg PO DAILY #6 tab 01/08/20  Unknown Rx


 


Benzonatate [Tessalon Perles] 100 mg PO Q8HR PRN #30 capsule 01/08/20  Unknown 

Rx


 


Ibuprofen [Motrin 800 MG tab] 800 mg PO Q8HR PRN #30 tablet 01/08/20  Unknown Rx


 


Albuterol INH(or & Nicu Only) 2 puff IH QID PRN #1 inhalation 03/07/20  Unknown 

Rx





[ProAir HFA Inhaler]     


 


Montelukast [Singulair] 10 mg PO QPM #14 tablet 03/07/20  Unknown Rx


 


predniSONE [Deltasone] 50 mg PO QDAY #5 tab 03/07/20  Unknown Rx


 


Erythromycin [Erythromycin Ophth 1 applic OP QID 10 Days #1 tube 06/14/20  

Unknown Rx





Oint]     














ED Physical Exam





- General


General appearance: alert, in no apparent distress





- Head


Head exam: Present: atraumatic, normocephalic





- Eye


Eye exam: Present: PERRL, EOMI, conjunctival injection (right), nystagmus, other

(small amount of mucus drainage in the eye, no obvious signs of ulcerations, no 

visualized foreign body, no signs of hordeolum, chalazion, or dacrocystitis).  

Absent: scleral icterus, periorbital swelling, periorbital tenderness





- ENT


ENT exam: Present: mucous membranes moist





- Respiratory


Respiratory exam: Present: normal lung sounds bilaterally.  Absent: respiratory 

distress, wheezes, rales, rhonchi, stridor, chest wall tenderness, accessory 

muscle use, decreased breath sounds, prolonged expiratory





- Cardiovascular


Cardiovascular Exam: Present: regular rate, normal rhythm, normal heart sounds. 

Absent: systolic murmur, diastolic murmur, rubs, gallop





- Neurological Exam


Neurological exam: Present: alert, oriented X3





- Psychiatric


Psychiatric exam: Present: normal affect, normal mood





- Skin


Skin exam: Present: warm, dry, intact





ED Course


                                   Vital Signs











  06/14/20 06/14/20





  11:10 11:16


 


Temperature 97.9 F 


 


Pulse Rate 98 H 87


 


Respiratory 20 18





Rate  


 


Blood Pressure 151/94 


 


Blood Pressure  147/98





[Left]  


 


O2 Sat by Pulse 99 99





Oximetry  














ED Medical Decision Making





- Medical Decision Making





pt is a 30 yo female who presents to the ED with c/o right eye erythema and 

irritation that began yesterday. she states that she was around someone with a 

stye. she states she has had drainage. no contact lens use. she denies anything 

getting into the eye.  she has associated photophobia. she states she also has a

 cough for 3 days. she states it is non productive. no fever, no vomiting, 

diarrhea, SOB, CP. PMHx none. no allergies to meds. no known sick contacts. 

states she recently returned from [place]. she is a smoker.  Vitals are stable. 

 Patient is afebrile, no hypoxia, no tachycardia. on exam: right conjunctival 

injection,small amount of mucus drainage in the eye, no obvious signs of 

ulcerations, no visualized foreign body, no signs of hordeolum, chalazion, or 

dacrocystitis, no periorbital edema.  Examination consistent with 

conjunctivitis.  Breath sounds are clear bilaterally, no clinical signs or 

symptoms of pneumonia.  Patient has had a dry cough for 3 days, she has no 

fever, no vomiting, no diarrhea, no shortness of breath, no chest pain, no known

 sick contacts.  Cough could be related to viral syndrome versus allergies.  

Discussed supportive care and submitted to medic treatment with patient.  Due to

 recent travel and dry cough during the COVID-19 pandemic, advised patient that 

she would need to self quarantine for 2 weeks.  Spoke with patient in great 

length about strict return precautions.  Discussed that she would need to 

follow-up with the ophthalmologist and a primary care physician.  Patient given 

prescription for erythromycin ophthalmic ointment.  Advised patient please use 

medication as prescribed. please wash your hands frequently. avoid rubbing the 

eye. increase your fluid intake. may drink warm tea. may use mucinex or theraflu

 over the counter. follow up with a primary care doctor in the next 2-3 days. 

use a humidifier. follow up with an ophthalmologist. return to the emergency 

room for any new or worsening symptoms including but not limited to high fever, 

unable to tolerate by mouth intake, shortness of breath, difficulty breathing, 

chest pain, etc. please self quarantine for 2 weeks, do not go out in public. if

 you are around others at home please wear a mask. if you cough or sneeze please

 do so in a napkin and immediately wash your hands. wash your hands frequently, 

wipe everything down. 


Critical care attestation.: 


If time is entered above; I have spent that time in minutes in the direct care 

of this critically ill patient, excluding procedure time.








ED Disposition


Clinical Impression: 


 Dry cough





Conjunctivitis


Qualifiers:


 Conjunctivitis type: acute Acute conjunctivitis type: unspecified Laterality: 

right Qualified Code(s): H10.31 - Unspecified acute conjunctivitis, right eye





Disposition: DC-01 TO HOME OR SELFCARE


Is pt being admited?: No


Does the pt Need Aspirin: No


Condition: Stable


Instructions:  COVID-19, Conjunctivitis (ED), Viral Syndrome (ED)


Additional Instructions: 


please use medication as prescribed. please wash your hands frequently. avoid 

rubbing the eye. increase your fluid intake. may drink warm tea. may use mucinex

 or theraflu over the counter. follow up with a primary care doctor in the next 

2-3 days. use a humidifier. follow up with an ophthalmologist. return to the 

emergency room for any new or worsening symptoms including but not limited to 

high fever, unable to tolerate by mouth intake, shortness of breath, difficulty 

breathing, chest pain, etc. please self quarantine for 2 weeks, do not go out in

 public. if you are around others at home please wear a mask. if you cough or 

sneeze please do so in a napkin and immediately wash your hands. wash your hands

 frequently, wipe everything down. 


Prescriptions: 


Erythromycin [Erythromycin Ophth Oint] 1 applic OP QID 10 Days #1 tube


Referrals: 


FELTON LITTLEJOHN MD [Staff Physician] - 2-3 Days


[facility] [Provider Group] - 2-3 Days


[facility] [Provider Group] - 2-3 Days


Time of Disposition: 11:16


Print Language: ENGLISH

## 2020-07-21 ENCOUNTER — HOSPITAL ENCOUNTER (EMERGENCY)
Dept: HOSPITAL 5 - ED | Age: 30
Discharge: HOME | End: 2020-07-21
Payer: COMMERCIAL

## 2020-07-21 VITALS — DIASTOLIC BLOOD PRESSURE: 98 MMHG | SYSTOLIC BLOOD PRESSURE: 155 MMHG

## 2020-07-21 DIAGNOSIS — F17.200: ICD-10-CM

## 2020-07-21 DIAGNOSIS — J45.901: Primary | ICD-10-CM

## 2020-07-21 PROCEDURE — 71045 X-RAY EXAM CHEST 1 VIEW: CPT

## 2020-07-21 PROCEDURE — 93005 ELECTROCARDIOGRAM TRACING: CPT

## 2020-07-21 PROCEDURE — 94640 AIRWAY INHALATION TREATMENT: CPT

## 2020-07-21 PROCEDURE — 96374 THER/PROPH/DIAG INJ IV PUSH: CPT

## 2020-07-21 PROCEDURE — 99283 EMERGENCY DEPT VISIT LOW MDM: CPT

## 2020-07-21 NOTE — EMERGENCY DEPARTMENT REPORT
ED Asthma HPI





- General


Chief Complaint: Dyspnea/Respdistress


Stated Complaint: CHEST PAIN/MAXIM/WHEEZING/COUGH


Time Seen by Provider: 07/21/20 08:13


Source: patient


Mode of arrival: Ambulatory


Limitations: No Limitations





- History of Present Illness


Initial Comments: 





29-year-old -American female patient with history of asthma presents with

complaints of asthma exacerbation x 3 days.  Patient states her symptoms began 

when she ran out of her albuterol inhaler.  Patient is currently a smoker of 

both cigarettes and marijuana.  She states she has a cough that is productive of

green mucus, however denies any fever/chills/sweats, chest pain, leg 

pain/swelling, hormone use, history of DVT/PE, recent long travel, hemoptysis, 

or history of cancer.  She denies any congestion or loss of smell or taste or 

known sick contacts.  Patient states this feels like her normal asthma 

exacerbation.





- Related Data


                                  Previous Rx's











 Medication  Instructions  Recorded  Last Taken  Type


 


Ondansetron [Zofran TAB] 4 mg PO Q8HR PRN #20 tablet 12/15/17 Unknown Rx


 


Tamsulosin HCl [Flomax] 0.4 mg PO DAILY #30 cap.er.24h 12/15/17 Unknown Rx


 


traMADoL [Ultram] 50 mg PO Q8HR PRN #30 tablet 12/15/17 Unknown Rx


 


Albuterol Mdi (or & Nicu Only) 2 puff IH QID PRN #1 inhalation 07/29/18 Unknown 

Rx





[ProAir HFA Inhaler]    


 


Amoxicillin [Amoxicillin TAB] 875 mg PO BID #14 tablet 07/29/18 Unknown Rx


 


guaiFENesin/CODEINE [Robitussin AC] 10 ml PO TID PRN #100 ml 07/29/18 Unknown Rx


 


ALBUTEROL Inhaler(NF) [VENTOLIN 2 puff IH Q4-6H PRN #1 inha 04/22/19 Unknown Rx





Inhaler(NF)]    


 


Albuterol Mdi (or & Nicu Only) 2 puff IH QID PRN #8.5 gram 01/08/20 Unknown Rx





[ProAir HFA Inhaler]    


 


Azithromycin [Zithromax Z-DAYDAY] 250 mg PO DAILY #6 tab 01/08/20 Unknown Rx


 


Benzonatate [Tessalon Perles] 100 mg PO Q8HR PRN #30 capsule 01/08/20 Unknown Rx


 


Ibuprofen [Motrin 800 MG tab] 800 mg PO Q8HR PRN #30 tablet 01/08/20 Unknown Rx


 


Albuterol Mdi (or & Nicu Only) 2 puff IH QID PRN #1 inhalation 03/07/20 Unknown 

Rx





[ProAir HFA Inhaler]    


 


predniSONE [Deltasone] 50 mg PO QDAY #5 tab 03/07/20 Unknown Rx


 


Erythromycin [Erythromycin Ophth 1 applic OP QID 10 Days #1 tube 06/14/20 

Unknown Rx





Oint]    


 


ALBUTEROL NEB's [Proventil 0.083% 2.5 mg IH TID PRN #30 neb 07/21/20 Unknown Rx





NEBS]    


 


Albuterol Mdi (or & Nicu Only) 1 puff IH Q4H PRN 30 Days #8.5 gram 07/21/20 

Unknown Rx





[ProAir HFA Inhaler]    


 


Montelukast [Singulair] 10 mg PO QPM 30 Days #30 tablet 07/21/20 Unknown Rx


 


Nebulizer Accessories [Innospire 1 each MC Q4H PRN #1 each 07/21/20 Unknown Rx





Replacement Filter]    


 


Nebulizer and Compressor [Ombra 1 each MC Q4H PRN #1 each 07/21/20 Unknown Rx





Compressor System]    


 


Prednisone [predniSONE 10 mg 10 mg PO .TAPER #1 tab.ds.pk 07/21/20 Unknown Rx





(6-Day Pack, 21 Tabs)]    











                                    Allergies











Allergy/AdvReac Type Severity Reaction Status Date / Time


 


No Known Allergies Allergy   Verified 06/14/20 09:16














ED Review of Systems


ROS: 


Stated complaint: CHEST PAIN/MAXIM/WHEEZING/COUGH


Other details as noted in HPI





Constitutional: denies: chills, diaphoresis, fever, malaise, weakness


ENT: denies: throat pain


Respiratory: cough, shortness of breath


Cardiovascular: denies: chest pain


Gastrointestinal: denies: abdominal pain, nausea, vomiting, diarrhea


Musculoskeletal: denies: back pain


Skin: denies: rash, lesions


Neurological: denies: headache


Hematological/Lymphatic: denies: swollen glands





ED Past Medical Hx





- Past Medical History


Previous Medical History?: Yes


Hx Hypertension: Yes (DURING PREG)


Hx Asthma: Yes


Additional medical history: heart murmur, Bronchitis





- Surgical History


Past Surgical History?: Yes


Additional Surgical History: C SECTION x2





- Social History


Smoking Status: Current Every Day Smoker


Substance Use Type: None





- Medications


Home Medications: 


                                Home Medications











 Medication  Instructions  Recorded  Confirmed  Last Taken  Type


 


Ondansetron [Zofran TAB] 4 mg PO Q8HR PRN #20 tablet 12/15/17  Unknown Rx


 


Tamsulosin HCl [Flomax] 0.4 mg PO DAILY #30 cap.er.24h 12/15/17  Unknown Rx


 


traMADoL [Ultram] 50 mg PO Q8HR PRN #30 tablet 12/15/17  Unknown Rx


 


Albuterol Mdi (or & Nicu Only) 2 puff IH QID PRN #1 inhalation 07/29/18  Unknown

 Rx





[ProAir HFA Inhaler]     


 


Amoxicillin [Amoxicillin TAB] 875 mg PO BID #14 tablet 07/29/18  Unknown Rx


 


guaiFENesin/CODEINE [Robitussin AC] 10 ml PO TID PRN #100 ml 07/29/18  Unknown 

Rx


 


ALBUTEROL Inhaler(NF) [VENTOLIN 2 puff IH Q4-6H PRN #1 inha 04/22/19  Unknown Rx





Inhaler(NF)]     


 


Albuterol Mdi (or & Nicu Only) 2 puff IH QID PRN #8.5 gram 01/08/20  Unknown Rx





[ProAir HFA Inhaler]     


 


Azithromycin [Zithromax Z-DAYDAY] 250 mg PO DAILY #6 tab 01/08/20  Unknown Rx


 


Benzonatate [Tessalon Perles] 100 mg PO Q8HR PRN #30 capsule 01/08/20  Unknown 

Rx


 


Ibuprofen [Motrin 800 MG tab] 800 mg PO Q8HR PRN #30 tablet 01/08/20  Unknown Rx


 


Albuterol Mdi (or & Nicu Only) 2 puff IH QID PRN #1 inhalation 03/07/20  Unknown

Rx





[ProAir HFA Inhaler]     


 


predniSONE [Deltasone] 50 mg PO QDAY #5 tab 03/07/20  Unknown Rx


 


Erythromycin [Erythromycin Ophth 1 applic OP QID 10 Days #1 tube 06/14/20  

Unknown Rx





Oint]     


 


ALBUTEROL NEB's [Proventil 0.083% 2.5 mg IH TID PRN #30 neb 07/21/20  Unknown Rx





NEBS]     


 


Albuterol Mdi (or & Nicu Only) 1 puff IH Q4H PRN 30 Days #8.5 gram 07/21/20  

Unknown Rx





[ProAir HFA Inhaler]     


 


Montelukast [Singulair] 10 mg PO QPM 30 Days #30 tablet 07/21/20  Unknown Rx


 


Nebulizer Accessories [Innospire 1 each MC Q4H PRN #1 each 07/21/20  Unknown Rx





Replacement Filter]     


 


Nebulizer and Compressor [Ombra 1 each MC Q4H PRN #1 each 07/21/20  Unknown Rx





Compressor System]     


 


Prednisone [predniSONE 10 mg 10 mg PO .TAPER #1 tab.ds.pk 07/21/20  Unknown Rx





(6-Day Pack, 21 Tabs)]     














ED Physical Exam





- General


Limitations: No Limitations


General appearance: alert, in no apparent distress, obese





- Head


Head exam: Present: atraumatic, normocephalic





- Eye


Eye exam: Present: normal appearance.  Absent: scleral icterus





- ENT


ENT exam: Present: normal orophraynx, mucous membranes moist





- Neck


Neck exam: Present: normal inspection, full ROM.  Absent: tenderness





- Respiratory


Respiratory exam: Present: wheezes, rhonchi.  Absent: respiratory distress, 

rales





- Cardiovascular


Cardiovascular Exam: Present: regular rate, normal rhythm.  Absent: systolic 

murmur, diastolic murmur, rubs, gallop





- Extremities Exam


Extremities exam: Present: normal inspection.  Absent: calf tenderness (No 

swelling or tenderness noted to lower extremities bilaterally)





- Back Exam


Back exam: Present: normal inspection





- Neurological Exam


Neurological exam: Present: alert, oriented X3





- Psychiatric


Psychiatric exam: Present: normal affect, normal mood





- Skin


Skin exam: Present: warm, dry, intact, normal color.  Absent: rash, cyanosis, 

erythema, petechiae, ecchymosis





ED Course


                                   Vital Signs











  07/21/20





  04:43


 


Temperature 97.6 F


 


Pulse Rate 88


 


Respiratory 16





Rate 


 


Blood Pressure 155/98


 


O2 Sat by Pulse 98





Oximetry 














ED Medical Decision Making





- Radiology Data


Radiology results: report reviewed





CHEST 1 VIEW 





INDICATION / CLINICAL INFORMATION: 


Chest Pain. 





COMPARISON: 


3/7/2020 





FINDINGS: 





SUPPORT DEVICES: None. 





HEART / MEDIASTINUM: No significant abnormality. 





LUNGS / PLEURA: No significant pulmonary or pleural abnormality. No 

pneumothorax. 





ADDITIONAL FINDINGS: No significant additional findings. 





IMPRESSION: 


1. No acute findings. 





- Medical Decision Making





Patient presents with an asthma exacerbation.  Chest x-ray is normal.  Patient 

was given a continuous DuoNeb treatment and Decadron.  Patient states she is no 

longer short of breath posttreatment.  Wheezing has decreased bilaterally in her

lungs posttreatment on reexamination.  Patient has been seen here multiple times

in the past for asthma exacerbation.  Patient provided with prescription for at 

home nebulizer and refills of her albuterol and Singulair.  Discussed in detail 

importance of smoking cessation and strict return precautions in detail with 

patient who verbalized understanding.  Her vitals are normal and she is well-

appearing and stable for discharge home.  Patient given referral for Dr. Deras of for follow-up.


Critical care attestation.: 


If time is entered above; I have spent that time in minutes in the direct care 

of this critically ill patient, excluding procedure time.








ED Disposition


Clinical Impression: 


Asthma exacerbation


Qualifiers:


 Asthma severity: moderate Asthma persistence: persistent Qualified Code(s): 

J45.41 - Moderate persistent asthma with (acute) exacerbation





Disposition: DC-01 TO HOME OR SELFCARE


Is pt being admited?: No


Condition: Stable


Prescriptions: 


Nebulizer Accessories [Innospire Replacement Filter] 1 each MC Q4H PRN #1 each


 PRN Reason: Shortness Of Breath


Nebulizer and Compressor [Ombra Compressor System] 1 each MC Q4H PRN #1 each


 PRN Reason: shortness of breath 


Prednisone [predniSONE 10 mg (6-Day Pack, 21 Tabs)] 10 mg PO .TAPER #1 tab.ds.pk


Albuterol Mdi (or & Nicu Only) [ProAir HFA Inhaler] 1 puff IH Q4H PRN 30 Days 

#8.5 gram


 PRN Reason: Shortness Of Breath


ALBUTEROL NEB's [Proventil 0.083% NEBS] 2.5 mg IH TID PRN #30 neb


 PRN Reason: shortness of breath 


Montelukast [Singulair] 10 mg PO QPM 30 Days #30 tablet


Referrals: 


FELTON DERAS MD [Staff Physician] - 3-5 Days

## 2020-07-21 NOTE — XRAY REPORT
CHEST 1 VIEW 



INDICATION / CLINICAL INFORMATION:

Chest Pain.



COMPARISON: 

3/7/2020



FINDINGS:



SUPPORT DEVICES: None.



HEART / MEDIASTINUM: No significant abnormality. 



LUNGS / PLEURA: No significant pulmonary or pleural abnormality. No pneumothorax. 



ADDITIONAL FINDINGS: No significant additional findings.



IMPRESSION:

1. No acute findings.



Signer Name: Angel Walden MD 

Signed: 7/21/2020 5:51 AM

Workstation Name: [email or web address]

## 2021-01-07 ENCOUNTER — HOSPITAL ENCOUNTER (EMERGENCY)
Dept: HOSPITAL 5 - ED | Age: 31
Discharge: HOME | End: 2021-01-07
Payer: SELF-PAY

## 2021-01-07 DIAGNOSIS — F17.200: ICD-10-CM

## 2021-01-07 DIAGNOSIS — G56.03: ICD-10-CM

## 2021-01-07 DIAGNOSIS — I10: ICD-10-CM

## 2021-01-07 DIAGNOSIS — R53.1: ICD-10-CM

## 2021-01-07 DIAGNOSIS — J45.909: ICD-10-CM

## 2021-01-07 DIAGNOSIS — S69.92XA: Primary | ICD-10-CM

## 2021-01-07 DIAGNOSIS — Z79.899: ICD-10-CM

## 2021-01-07 LAB
ALBUMIN SERPL-MCNC: 4.4 G/DL (ref 3.9–5)
ALT SERPL-CCNC: 25 UNITS/L (ref 7–56)
BASOPHILS # (AUTO): 0.1 K/MM3 (ref 0–0.1)
BASOPHILS NFR BLD AUTO: 0.6 % (ref 0–1.8)
BUN SERPL-MCNC: 5 MG/DL (ref 7–17)
BUN/CREAT SERPL: 7 %
CALCIUM SERPL-MCNC: 9.9 MG/DL (ref 8.4–10.2)
EOSINOPHIL # BLD AUTO: 0.1 K/MM3 (ref 0–0.4)
EOSINOPHIL NFR BLD AUTO: 0.7 % (ref 0–4.3)
HCT VFR BLD CALC: 37.7 % (ref 30.3–42.9)
HEMOLYSIS INDEX: 5
HGB BLD-MCNC: 12.9 GM/DL (ref 10.1–14.3)
LYMPHOCYTES # BLD AUTO: 2.4 K/MM3 (ref 1.2–5.4)
LYMPHOCYTES NFR BLD AUTO: 26.6 % (ref 13.4–35)
MCHC RBC AUTO-ENTMCNC: 34 % (ref 30–34)
MCV RBC AUTO: 89 FL (ref 79–97)
MONOCYTES # (AUTO): 0.5 K/MM3 (ref 0–0.8)
MONOCYTES % (AUTO): 6.1 % (ref 0–7.3)
PLATELET # BLD: 271 K/MM3 (ref 140–440)
RBC # BLD AUTO: 4.23 M/MM3 (ref 3.65–5.03)

## 2021-01-07 PROCEDURE — 36415 COLL VENOUS BLD VENIPUNCTURE: CPT

## 2021-01-07 PROCEDURE — 71046 X-RAY EXAM CHEST 2 VIEWS: CPT

## 2021-01-07 PROCEDURE — 80053 COMPREHEN METABOLIC PANEL: CPT

## 2021-01-07 PROCEDURE — 99283 EMERGENCY DEPT VISIT LOW MDM: CPT

## 2021-01-07 PROCEDURE — 84703 CHORIONIC GONADOTROPIN ASSAY: CPT

## 2021-01-07 PROCEDURE — 85025 COMPLETE CBC W/AUTO DIFF WBC: CPT
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
## 2021-01-07 NOTE — XRAY REPORT
CHEST 2 VIEWS 



INDICATION / CLINICAL INFORMATION:

cough.



COMPARISON: 

Chest one view from 7/21/2020.



FINDINGS:



SUPPORT DEVICES: None.

HEART / MEDIASTINUM: No significant abnormality. 

LUNGS / PLEURA: No significant pulmonary or pleural abnormality. No pneumothorax. 



ADDITIONAL FINDINGS: No significant additional findings.



IMPRESSION:

1. No acute abnormality of the chest.



Signer Name: J Luis Anaya MD 

Signed: 1/7/2021 2:29 PM

Workstation Name: [id number]
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
## 2021-10-01 ENCOUNTER — HOSPITAL ENCOUNTER (EMERGENCY)
Dept: HOSPITAL 5 - ED | Age: 31
Discharge: HOME | End: 2021-10-01
Payer: SELF-PAY

## 2021-10-01 VITALS — DIASTOLIC BLOOD PRESSURE: 76 MMHG | SYSTOLIC BLOOD PRESSURE: 148 MMHG

## 2021-10-01 DIAGNOSIS — F17.200: ICD-10-CM

## 2021-10-01 DIAGNOSIS — I10: ICD-10-CM

## 2021-10-01 DIAGNOSIS — Z98.890: ICD-10-CM

## 2021-10-01 DIAGNOSIS — J45.901: Primary | ICD-10-CM

## 2021-10-01 DIAGNOSIS — Z79.899: ICD-10-CM

## 2021-10-01 PROCEDURE — 99283 EMERGENCY DEPT VISIT LOW MDM: CPT

## 2021-10-01 PROCEDURE — 94640 AIRWAY INHALATION TREATMENT: CPT

## 2021-10-01 NOTE — EMERGENCY DEPARTMENT REPORT
ED Asthma HPI





- General


Chief Complaint: Dyspnea/Respdistress


Stated Complaint: CHEST PAINS


PUI?: No


Time Seen by Provider: 10/01/21 07:55


Source: patient


Mode of arrival: Ambulatory


Limitations: No Limitations





- History of Present Illness


Initial Comments: 





CC: "I need a breathing treatment."





HPI:  This is a 32 yo female with hx of asthma, tobacco use, heart murmur who 

presents with shortness of breath.  She has cough and shortness of breath.  She 

does not have access to her inhaler or nebulizer.  Her medications and equipment

were in a vehicle which was towed.  She denies fever, chest pian, abdominal 

pain.





She recently received her first dose of COVID vaccine.


MD Complaint: "asthma attack", shortness of breath


-: Gradual, days(s) (one day)


Asthma History: history of frequent attac, history of prior ED visit


Severity: mild


Context: ran out of meds


Associated Symptoms: dry cough





- Related Data


                                  Previous Rx's











 Medication  Instructions  Recorded  Last Taken  Type


 


Ondansetron [Zofran TAB] 4 mg PO Q8HR PRN #20 tablet 12/15/17 Unknown Rx


 


Tamsulosin HCl [Flomax] 0.4 mg PO DAILY #30 cap.er.24h 12/15/17 Unknown Rx


 


traMADoL [Ultram] 50 mg PO Q8HR PRN #30 tablet 12/15/17 Unknown Rx


 


Albuterol Mdi (or & Nicu Only) 2 puff IH QID PRN #1 inhalation 07/29/18 Unknown 

Rx





[ProAir HFA Inhaler]    


 


Amoxicillin [Amoxicillin TAB] 875 mg PO BID #14 tablet 07/29/18 Unknown Rx


 


guaiFENesin/CODEINE [Robitussin AC] 10 ml PO TID PRN #100 ml 07/29/18 Unknown Rx


 


ALBUTEROL Inhaler(NF) [VENTOLIN 2 puff IH Q4-6H PRN #1 inha 04/22/19 Unknown Rx





Inhaler(NF)]    


 


Albuterol Mdi (or & Nicu Only) 2 puff IH QID PRN #8.5 gram 01/08/20 Unknown Rx





[ProAir HFA Inhaler]    


 


Azithromycin [Zithromax Z-DAYDAY] 250 mg PO DAILY #6 tab 01/08/20 Unknown Rx


 


Benzonatate [Tessalon Perles] 100 mg PO Q8HR PRN #30 capsule 01/08/20 Unknown Rx


 


Ibuprofen [Motrin 800 MG tab] 800 mg PO Q8HR PRN #30 tablet 01/08/20 Unknown Rx


 


Albuterol Mdi (or & Nicu Only) 2 puff IH QID PRN #1 inhalation 03/07/20 Unknown 

Rx





[ProAir HFA Inhaler]    


 


predniSONE [Deltasone] 50 mg PO QDAY #5 tab 03/07/20 Unknown Rx


 


Erythromycin [Erythromycin Ophth 1 applic OP QID 10 Days #1 tube 06/14/20 

Unknown Rx





Oint]    


 


ALBUTEROL NEB's [Proventil 0.083% 2.5 mg IH TID PRN #30 neb 07/21/20 Unknown Rx





NEBS]    


 


Albuterol Mdi (or & Nicu Only) 1 puff IH Q4H PRN 30 Days #8.5 gram 07/21/20 

Unknown Rx





[ProAir HFA Inhaler]    


 


Montelukast [Singulair] 10 mg PO QPM 30 Days #30 tablet 07/21/20 Unknown Rx


 


Nebulizer Accessories [Innospire 1 each MC Q4H PRN #1 each 07/21/20 Unknown Rx





Replacement Filter]    


 


Nebulizer and Compressor [Ombra 1 each MC Q4H PRN #1 each 07/21/20 Unknown Rx





Compressor System]    


 


Prednisone [predniSONE 10 mg 10 mg PO .TAPER #1 tab.ds.pk 07/21/20 Unknown Rx





(6-Day Pack, 21 Tabs)]    


 


Naproxen [Naprosyn] 500 mg PO BID #14 tablet 01/07/21 Unknown Rx


 


ALBUTEROL NEB's [Proventil 0.083% 2.5 mg IH Q6H PRN #1 box 10/01/21 Unknown Rx





NEBS]    


 


Albuterol Mdi (or & Nicu Only) 2 puff IH Q6H PRN #8.5 gram 10/01/21 Unknown Rx





[ProAir HFA Inhaler]    


 


Prednisone [predniSONE 10 mg 10 mg PO .TAPER #1 tab.ds.pk 10/01/21 Unknown Rx





(6-Day Pack, 21 Tabs)]    











                                    Allergies











Allergy/AdvReac Type Severity Reaction Status Date / Time


 


No Known Allergies Allergy   Verified 01/07/21 12:09














ED Review of Systems


ROS: 


Stated complaint: CHEST PAINS


Other details as noted in HPI





Comment: All other systems reviewed and negative


Constitutional: denies: chills, fever, malaise


Respiratory: cough, shortness of breath, wheezing


Cardiovascular: denies: chest pain


Gastrointestinal: denies: abdominal pain, nausea, vomiting





ED Past Medical Hx





- Past Medical History


Previous Medical History?: Yes


Hx Hypertension: Yes (DURING PREG)


Hx Asthma: Yes


Additional medical history: heart murmur, Bronchitis





- Surgical History


Past Surgical History?: Yes


Additional Surgical History: C SECTION x2





- Social History


Smoking Status: Current Every Day Smoker


Substance Use Type: None





- Medications


Home Medications: 


                                Home Medications











 Medication  Instructions  Recorded  Confirmed  Last Taken  Type


 


Ondansetron [Zofran TAB] 4 mg PO Q8HR PRN #20 tablet 12/15/17  Unknown Rx


 


Tamsulosin HCl [Flomax] 0.4 mg PO DAILY #30 cap.er.24h 12/15/17  Unknown Rx


 


traMADoL [Ultram] 50 mg PO Q8HR PRN #30 tablet 12/15/17  Unknown Rx


 


Albuterol Mdi (or & Nicu Only) 2 puff IH QID PRN #1 inhalation 07/29/18  Unknown

 Rx





[ProAir HFA Inhaler]     


 


Amoxicillin [Amoxicillin TAB] 875 mg PO BID #14 tablet 07/29/18  Unknown Rx


 


guaiFENesin/CODEINE [Robitussin AC] 10 ml PO TID PRN #100 ml 07/29/18  Unknown 

Rx


 


ALBUTEROL Inhaler(NF) [VENTOLIN 2 puff IH Q4-6H PRN #1 inha 04/22/19  Unknown Rx





Inhaler(NF)]     


 


Albuterol Mdi (or & Nicu Only) 2 puff IH QID PRN #8.5 gram 01/08/20  Unknown Rx





[ProAir HFA Inhaler]     


 


Azithromycin [Zithromax Z-DAYDAY] 250 mg PO DAILY #6 tab 01/08/20  Unknown Rx


 


Benzonatate [Tessalon Perles] 100 mg PO Q8HR PRN #30 capsule 01/08/20  Unknown 

Rx


 


Ibuprofen [Motrin 800 MG tab] 800 mg PO Q8HR PRN #30 tablet 01/08/20  Unknown Rx


 


Albuterol Mdi (or & Nicu Only) 2 puff IH QID PRN #1 inhalation 03/07/20  Unknown

Rx





[ProAir HFA Inhaler]     


 


predniSONE [Deltasone] 50 mg PO QDAY #5 tab 03/07/20  Unknown Rx


 


Erythromycin [Erythromycin Ophth 1 applic OP QID 10 Days #1 tube 06/14/20  

Unknown Rx





Oint]     


 


ALBUTEROL NEB's [Proventil 0.083% 2.5 mg IH TID PRN #30 neb 07/21/20  Unknown Rx





NEBS]     


 


Albuterol Mdi (or & Nicu Only) 1 puff IH Q4H PRN 30 Days #8.5 gram 07/21/20  

Unknown Rx





[ProAir HFA Inhaler]     


 


Montelukast [Singulair] 10 mg PO QPM 30 Days #30 tablet 07/21/20  Unknown Rx


 


Nebulizer Accessories [Innospire 1 each MC Q4H PRN #1 each 07/21/20  Unknown Rx





Replacement Filter]     


 


Nebulizer and Compressor [Ombra 1 each MC Q4H PRN #1 each 07/21/20  Unknown Rx





Compressor System]     


 


Prednisone [predniSONE 10 mg 10 mg PO .TAPER #1 tab.ds.pk 07/21/20  Unknown Rx





(6-Day Pack, 21 Tabs)]     


 


Naproxen [Naprosyn] 500 mg PO BID #14 tablet 01/07/21  Unknown Rx


 


ALBUTEROL NEB's [Proventil 0.083% 2.5 mg IH Q6H PRN #1 box 10/01/21  Unknown Rx





NEBS]     


 


Albuterol Mdi (or & Nicu Only) 2 puff IH Q6H PRN #8.5 gram 10/01/21  Unknown Rx





[ProAir HFA Inhaler]     


 


Prednisone [predniSONE 10 mg 10 mg PO .TAPER #1 tab.ds.pk 10/01/21  Unknown Rx





(6-Day Pack, 21 Tabs)]     














ED Physical Exam





- General


Limitations: No Limitations


General appearance: alert, in no apparent distress, other (Frequent cough)





- Head


Head exam: Present: atraumatic, normocephalic





- Eye


Eye exam: Present: normal appearance





- ENT


ENT exam: Present: mucous membranes moist





- Neck


Neck exam: Present: normal inspection





- Respiratory


Respiratory exam: Present: normal lung sounds bilaterally.  Absent: respiratory 

distress, wheezes, rales, rhonchi, chest wall tenderness, accessory muscle use, 

decreased breath sounds, prolonged expiratory





- Cardiovascular


Cardiovascular Exam: Present: regular rate, normal rhythm.  Absent: systolic 

murmur, diastolic murmur, rubs, gallop





- GI/Abdominal


GI/Abdominal exam: Present: soft, normal bowel sounds





- Extremities Exam


Extremities exam: Present: normal inspection





- Back Exam


Back exam: Present: normal inspection





- Neurological Exam


Neurological exam: Present: alert, oriented X3





- Psychiatric


Psychiatric exam: Present: normal affect, normal mood





- Skin


Skin exam: Present: warm, dry, intact, normal color.  Absent: rash





ED Course


                                   Vital Signs











  10/01/21





  07:52


 


Temperature 98 F


 


Pulse Rate 79


 


Respiratory 16





Rate 


 


Blood Pressure 148/76





[Left] 


 


O2 Sat by Pulse 97





Oximetry 














ED Medical Decision Making





- Radiology Data





Asthma exacerbation: Frequent cough noted,.


Patient received albuterol 5 mg Atrovent 1 mg p.o. prednisone.  Prescribed 

albuterol solution and albuterol MDI.  Prescribed prednisone taper.  Referred to

 internal medicine physician patients that have a primary physician.





- Medical Decision Making





Mild asthma exacerbation: Patient received albuterol 5 mg 1 mg Atrovent 

emergency department.  She received p.o. prednisone.  Discharged with albuterol 

nebulizer solution, albuterol MDI and prednisone taper.  Referred to internal 

medicine physician.


Critical care attestation.: 


If time is entered above; I have spent that time in minutes in the direct care 

of this critically ill patient, excluding procedure time.








ED Disposition


Clinical Impression: 


 Asthma exacerbation





Disposition: 01 HOME / SELF CARE / HOMELESS


Is pt being admited?: No


Does the pt Need Aspirin: No


Condition: Stable


Instructions:  Asthma, Adult


Prescriptions: 


Prednisone [predniSONE 10 mg (6-Day Pack, 21 Tabs)] 10 mg PO .TAPER #1 tab.ds.pk


Albuterol Mdi (or & Nicu Only) [ProAir HFA Inhaler] 2 puff IH Q6H PRN #8.5 gram


 PRN Reason: Shortness Of Breath


ALBUTEROL NEB's [Proventil 0.083% NEBS] 2.5 mg IH Q6H PRN #1 box


 PRN Reason: Shortness Of Breath


Referrals: 


FELTON LITTLEJOHN MD [Staff Physician] - 3-5 Days

## 2022-08-20 ENCOUNTER — HOSPITAL ENCOUNTER (EMERGENCY)
Dept: HOSPITAL 5 - ED | Age: 32
Discharge: LEFT BEFORE DISCHARGE | End: 2022-08-20
Payer: SELF-PAY

## 2022-08-20 VITALS — DIASTOLIC BLOOD PRESSURE: 65 MMHG | SYSTOLIC BLOOD PRESSURE: 129 MMHG

## 2022-08-20 DIAGNOSIS — Z53.21: ICD-10-CM

## 2022-08-20 DIAGNOSIS — R60.0: Primary | ICD-10-CM
